# Patient Record
Sex: MALE | Race: WHITE | HISPANIC OR LATINO | Employment: UNEMPLOYED
[De-identification: names, ages, dates, MRNs, and addresses within clinical notes are randomized per-mention and may not be internally consistent; named-entity substitution may affect disease eponyms.]

---

## 2018-03-08 PROCEDURE — 80053 COMPREHEN METABOLIC PANEL: CPT | Performed by: INTERNAL MEDICINE

## 2018-03-08 PROCEDURE — 84443 ASSAY THYROID STIM HORMONE: CPT | Performed by: INTERNAL MEDICINE

## 2018-03-08 PROCEDURE — 85025 COMPLETE CBC W/AUTO DIFF WBC: CPT | Performed by: INTERNAL MEDICINE

## 2018-03-08 PROCEDURE — 80061 LIPID PANEL: CPT | Performed by: INTERNAL MEDICINE

## 2018-08-06 PROCEDURE — 80175 DRUG SCREEN QUAN LAMOTRIGINE: CPT | Performed by: INTERNAL MEDICINE

## 2019-04-15 ENCOUNTER — DIAGNOSTIC TRANS (OUTPATIENT)
Dept: OTHER | Age: 39
End: 2019-04-15

## 2019-04-15 ENCOUNTER — HOSPITAL (OUTPATIENT)
Dept: OTHER | Age: 39
End: 2019-04-15
Attending: EMERGENCY MEDICINE

## 2019-04-15 LAB
ALBUMIN SERPL-MCNC: 3.7 GM/DL (ref 3.6–5.1)
ALBUMIN/GLOB SERPL: 1.1 {RATIO} (ref 1–2.4)
ALP SERPL-CCNC: 78 UNIT/L (ref 45–117)
ALT SERPL-CCNC: 71 UNIT/L
ANALYZER ANC (IANC): NORMAL
ANION GAP SERPL CALC-SCNC: 10 MMOL/L (ref 10–20)
AST SERPL-CCNC: 31 UNIT/L
BASOPHILS # BLD: 0 THOUSAND/MCL (ref 0–0.3)
BASOPHILS NFR BLD: 1 %
BILIRUB SERPL-MCNC: 0.5 MG/DL (ref 0.2–1)
BUN SERPL-MCNC: 9 MG/DL (ref 6–20)
BUN/CREAT SERPL: 9 (ref 7–25)
CALCIUM SERPL-MCNC: 8.9 MG/DL (ref 8.4–10.2)
CHLORIDE: 109 MMOL/L (ref 98–107)
CO2 SERPL-SCNC: 28 MMOL/L (ref 21–32)
CREAT SERPL-MCNC: 0.98 MG/DL (ref 0.67–1.17)
DIFFERENTIAL METHOD BLD: NORMAL
EOSINOPHIL # BLD: 0.1 THOUSAND/MCL (ref 0.1–0.5)
EOSINOPHIL NFR BLD: 1 %
ERYTHROCYTE [DISTWIDTH] IN BLOOD: 11.7 % (ref 11–15)
GLOBULIN SER-MCNC: 3.3 GM/DL (ref 2–4)
GLUCOSE SERPL-MCNC: 128 MG/DL (ref 65–99)
HEMATOCRIT: 43 % (ref 39–51)
HGB BLD-MCNC: 14.7 GM/DL (ref 13–17)
IMM GRANULOCYTES # BLD AUTO: 0 THOUSAND/MCL (ref 0–0.2)
IMM GRANULOCYTES NFR BLD: 0 %
LYMPHOCYTES # BLD: 1.8 THOUSAND/MCL (ref 1–4.8)
LYMPHOCYTES NFR BLD: 32 %
MCH RBC QN AUTO: 30.9 PG (ref 26–34)
MCHC RBC AUTO-ENTMCNC: 34.2 GM/DL (ref 32–36.5)
MCV RBC AUTO: 90.5 FL (ref 78–100)
MONOCYTES # BLD: 0.3 THOUSAND/MCL (ref 0.3–0.9)
MONOCYTES NFR BLD: 5 %
NEUTROPHILS # BLD: 3.5 THOUSAND/MCL (ref 1.8–7.7)
NEUTROPHILS NFR BLD: 61 %
NEUTS SEG NFR BLD: NORMAL %
NRBC (NRBCRE): 0 /100 WBC
PLATELET # BLD: 234 THOUSAND/MCL (ref 140–450)
POTASSIUM SERPL-SCNC: 3.9 MMOL/L (ref 3.4–5.1)
PROT SERPL-MCNC: 7 GM/DL (ref 6.4–8.2)
RBC # BLD: 4.75 MILLION/MCL (ref 4.5–5.9)
SODIUM SERPL-SCNC: 143 MMOL/L (ref 135–145)
TROPONIN I SERPL HS-MCNC: <0.02 NG/ML
TROPONIN I SERPL HS-MCNC: <0.02 NG/ML
WBC # BLD: 5.6 THOUSAND/MCL (ref 4.2–11)

## 2020-02-28 ENCOUNTER — HOSPITAL (OUTPATIENT)
Dept: OTHER | Age: 40
End: 2020-02-28

## 2020-02-28 LAB
ALBUMIN SERPL-MCNC: 3.6 G/DL (ref 3.6–5.1)
ALBUMIN/GLOB SERPL: 1.1 {RATIO} (ref 1–2.4)
ALP SERPL-CCNC: 69 UNITS/L (ref 45–117)
ALT SERPL-CCNC: 51 UNITS/L
AMORPH SED URNS QL MICRO: NORMAL
ANALYZER ANC (IANC): ABNORMAL
ANION GAP SERPL CALC-SCNC: 10 MMOL/L (ref 10–20)
APPEARANCE UR: CLEAR
AST SERPL-CCNC: 25 UNITS/L
BACTERIA #/AREA URNS HPF: NORMAL /HPF
BASOPHILS # BLD: 0 K/MCL (ref 0–0.3)
BASOPHILS NFR BLD: 0 %
BILIRUB SERPL-MCNC: 0.3 MG/DL (ref 0.2–1)
BILIRUB UR QL: NEGATIVE
BUN SERPL-MCNC: 11 MG/DL (ref 6–20)
BUN/CREAT SERPL: 11 (ref 7–25)
CALCIUM SERPL-MCNC: 8.3 MG/DL (ref 8.4–10.2)
CAOX CRY URNS QL MICRO: NORMAL
CHLORIDE SERPL-SCNC: 106 MMOL/L (ref 98–107)
CO2 SERPL-SCNC: 27 MMOL/L (ref 21–32)
COLOR UR: YELLOW
CREAT SERPL-MCNC: 0.99 MG/DL (ref 0.67–1.17)
DIFFERENTIAL METHOD BLD: ABNORMAL
EOSINOPHIL # BLD: 0.1 K/MCL (ref 0.1–0.5)
EOSINOPHIL NFR BLD: 2 %
EPITH CASTS #/AREA URNS LPF: NORMAL /[LPF]
ERYTHROCYTE [DISTWIDTH] IN BLOOD: 12 % (ref 11–15)
FATTY CASTS #/AREA URNS LPF: NORMAL /[LPF]
GLOBULIN SER-MCNC: 3.4 G/DL (ref 2–4)
GLUCOSE SERPL-MCNC: 98 MG/DL (ref 65–99)
GLUCOSE UR-MCNC: NEGATIVE MG/DL
GRAN CASTS #/AREA URNS LPF: NORMAL /[LPF]
HCT VFR BLD CALC: 40.8 % (ref 39–51)
HGB BLD-MCNC: 14.2 G/DL (ref 13–17)
HGB UR QL: NEGATIVE
HYALINE CASTS #/AREA URNS LPF: NORMAL /LPF (ref 0–5)
IMM GRANULOCYTES # BLD AUTO: 0 K/MCL (ref 0–0.2)
IMM GRANULOCYTES NFR BLD: 0 %
KETONES UR-MCNC: NEGATIVE MG/DL
LEUKOCYTE ESTERASE UR QL STRIP: NEGATIVE
LIPASE SERPL-CCNC: 55 UNITS/L (ref 73–393)
LYMPHOCYTES # BLD: 2.4 K/MCL (ref 1–4.8)
LYMPHOCYTES NFR BLD: 34 %
MCH RBC QN AUTO: 31.9 PG (ref 26–34)
MCHC RBC AUTO-ENTMCNC: 34.8 G/DL (ref 32–36.5)
MCV RBC AUTO: 91.7 FL (ref 78–100)
MIXED CELL CASTS #/AREA URNS LPF: NORMAL /[LPF]
MONOCYTES # BLD: 0.7 K/MCL (ref 0.3–0.9)
MONOCYTES NFR BLD: 10 %
MUCOUS THREADS URNS QL MICRO: PRESENT
NEUTROPHILS # BLD: 3.7 K/MCL (ref 1.8–7.7)
NEUTROPHILS NFR BLD: 54 %
NEUTS SEG NFR BLD: ABNORMAL %
NITRITE UR QL: NEGATIVE
NRBC (NRBCRE): 0 /100 WBC
PH UR: 6 UNITS (ref 5–7)
PLATELET # BLD: 217 K/MCL (ref 140–450)
POTASSIUM SERPL-SCNC: 4.2 MMOL/L (ref 3.4–5.1)
PROT SERPL-MCNC: 7 G/DL (ref 6.4–8.2)
PROT UR QL: NEGATIVE MG/DL
RBC # BLD: 4.45 MIL/MCL (ref 4.5–5.9)
RBC #/AREA URNS HPF: NORMAL /HPF (ref 0–2)
RBC CASTS #/AREA URNS LPF: NORMAL /[LPF]
RENAL EPI CELLS #/AREA URNS HPF: NORMAL /[HPF]
SODIUM SERPL-SCNC: 139 MMOL/L (ref 135–145)
SP GR UR: 1.02 (ref 1–1.03)
SPECIMEN SOURCE: NORMAL
SPERM URNS QL MICRO: NORMAL
SQUAMOUS #/AREA URNS HPF: NORMAL /HPF (ref 0–5)
T VAGINALIS URNS QL MICRO: NORMAL
TRI-PHOS CRY URNS QL MICRO: NORMAL
URATE CRY URNS QL MICRO: NORMAL
UROBILINOGEN UR QL: 0.2 MG/DL (ref 0–1)
WAXY CASTS #/AREA URNS LPF: NORMAL /[LPF]
WBC # BLD: 6.9 K/MCL (ref 4.2–11)
WBC #/AREA URNS HPF: NORMAL /HPF (ref 0–5)
WBC CASTS #/AREA URNS LPF: NORMAL /[LPF]
YEAST HYPHAE URNS QL MICRO: NORMAL
YEAST URNS QL MICRO: NORMAL

## 2020-02-28 PROCEDURE — 99285 EMERGENCY DEPT VISIT HI MDM: CPT | Performed by: EMERGENCY MEDICINE

## 2020-02-29 ENCOUNTER — HOSPITAL (OUTPATIENT)
Dept: OTHER | Age: 40
End: 2020-02-29

## 2020-02-29 LAB
ALBUMIN SERPL-MCNC: 3.2 G/DL (ref 3.6–5.1)
ALBUMIN/GLOB SERPL: 1 {RATIO} (ref 1–2.4)
ALP SERPL-CCNC: 58 UNITS/L (ref 45–117)
ALT SERPL-CCNC: 41 UNITS/L
ANALYZER ANC (IANC): ABNORMAL
ANION GAP SERPL CALC-SCNC: 10 MMOL/L (ref 10–20)
AST SERPL-CCNC: 19 UNITS/L
BASOPHILS # BLD: 0 K/MCL (ref 0–0.3)
BASOPHILS NFR BLD: 1 %
BILIRUB SERPL-MCNC: 0.5 MG/DL (ref 0.2–1)
BUN SERPL-MCNC: 11 MG/DL (ref 6–20)
BUN/CREAT SERPL: 10 (ref 7–25)
CALCIUM SERPL-MCNC: 8.2 MG/DL (ref 8.4–10.2)
CHLORIDE SERPL-SCNC: 106 MMOL/L (ref 98–107)
CO2 SERPL-SCNC: 27 MMOL/L (ref 21–32)
CREAT SERPL-MCNC: 1.07 MG/DL (ref 0.67–1.17)
DIFFERENTIAL METHOD BLD: ABNORMAL
EOSINOPHIL # BLD: 0.1 K/MCL (ref 0.1–0.5)
EOSINOPHIL NFR BLD: 2 %
ERYTHROCYTE [DISTWIDTH] IN BLOOD: 11.9 % (ref 11–15)
GLOBULIN SER-MCNC: 3.1 G/DL (ref 2–4)
GLUCOSE BLDC GLUCOMTR-MCNC: 125 MG/DL (ref 70–99)
GLUCOSE SERPL-MCNC: 91 MG/DL (ref 65–99)
HCT VFR BLD CALC: 38.2 % (ref 39–51)
HGB BLD-MCNC: 12.9 G/DL (ref 13–17)
IMM GRANULOCYTES # BLD AUTO: 0 K/MCL (ref 0–0.2)
IMM GRANULOCYTES NFR BLD: 0 %
LYMPHOCYTES # BLD: 2.5 K/MCL (ref 1–4.8)
LYMPHOCYTES NFR BLD: 43 %
MCH RBC QN AUTO: 31.2 PG (ref 26–34)
MCHC RBC AUTO-ENTMCNC: 33.8 G/DL (ref 32–36.5)
MCV RBC AUTO: 92.5 FL (ref 78–100)
MONOCYTES # BLD: 0.6 K/MCL (ref 0.3–0.9)
MONOCYTES NFR BLD: 10 %
NEUTROPHILS # BLD: 2.6 K/MCL (ref 1.8–7.7)
NEUTROPHILS NFR BLD: 44 %
NEUTS SEG NFR BLD: ABNORMAL %
NRBC (NRBCRE): 0 /100 WBC
PLATELET # BLD: 188 K/MCL (ref 140–450)
POTASSIUM SERPL-SCNC: 3.7 MMOL/L (ref 3.4–5.1)
PROT SERPL-MCNC: 6.3 G/DL (ref 6.4–8.2)
RBC # BLD: 4.13 MIL/MCL (ref 4.5–5.9)
SODIUM SERPL-SCNC: 139 MMOL/L (ref 135–145)
WBC # BLD: 5.9 K/MCL (ref 4.2–11)

## 2020-02-29 PROCEDURE — 99244 OFF/OP CNSLTJ NEW/EST MOD 40: CPT | Performed by: SURGERY

## 2020-02-29 PROCEDURE — 99220 INITIAL OBSERVATION CARE,LEVL III: CPT | Performed by: GENERAL PRACTICE

## 2020-02-29 PROCEDURE — 44970 LAPAROSCOPY APPENDECTOMY: CPT | Performed by: SURGERY

## 2020-03-04 LAB — PATHOLOGIST NAME: NORMAL

## 2020-07-24 ENCOUNTER — LAB SERVICES (OUTPATIENT)
Dept: LAB | Age: 40
End: 2020-07-24

## 2020-07-24 ENCOUNTER — EMPLOYEE HEALTH (OUTPATIENT)
Dept: OTHER | Age: 40
End: 2020-07-24

## 2020-07-24 DIAGNOSIS — Z20.822 SUSPECTED COVID-19 VIRUS INFECTION: Primary | ICD-10-CM

## 2020-07-24 DIAGNOSIS — Z20.822 SUSPECTED COVID-19 VIRUS INFECTION: ICD-10-CM

## 2020-07-24 PROCEDURE — 87635 SARS-COV-2 COVID-19 AMP PRB: CPT | Performed by: HOSPITALIST

## 2020-07-25 ENCOUNTER — EMPLOYEE HEALTH (OUTPATIENT)
Dept: OTHER | Age: 40
End: 2020-07-25

## 2020-07-25 LAB
SARS-COV-2 RNA RESP QL NAA+PROBE: DETECTED
SPECIMEN SOURCE: ABNORMAL

## 2020-07-26 ENCOUNTER — EMPLOYEE HEALTH (OUTPATIENT)
Dept: OTHER | Age: 40
End: 2020-07-26

## 2020-07-27 ENCOUNTER — EMPLOYEE HEALTH (OUTPATIENT)
Dept: OTHER | Age: 40
End: 2020-07-27

## 2020-07-29 ENCOUNTER — EMPLOYEE HEALTH (OUTPATIENT)
Dept: OTHER | Age: 40
End: 2020-07-29

## 2020-07-31 ENCOUNTER — EMPLOYEE HEALTH (OUTPATIENT)
Dept: OTHER | Age: 40
End: 2020-07-31

## 2020-08-01 ENCOUNTER — EMPLOYEE HEALTH (OUTPATIENT)
Dept: OTHER | Age: 40
End: 2020-08-01

## 2020-08-04 ENCOUNTER — EMPLOYEE HEALTH (OUTPATIENT)
Dept: OTHER | Age: 40
End: 2020-08-04

## 2020-08-07 ENCOUNTER — EMPLOYEE HEALTH (OUTPATIENT)
Dept: OTHER | Age: 40
End: 2020-08-07

## 2020-08-08 ENCOUNTER — EMPLOYEE HEALTH (OUTPATIENT)
Dept: OTHER | Age: 40
End: 2020-08-08

## 2020-08-10 ENCOUNTER — EMPLOYEE HEALTH (OUTPATIENT)
Dept: OTHER | Age: 40
End: 2020-08-10

## 2020-08-18 ENCOUNTER — HOSPITAL ENCOUNTER (OUTPATIENT)
Dept: GENERAL RADIOLOGY | Age: 40
Discharge: HOME | End: 2020-08-18
Attending: INTERNAL MEDICINE
Payer: COMMERCIAL

## 2020-08-18 DIAGNOSIS — R06.02 SOB (SHORTNESS OF BREATH): ICD-10-CM

## 2020-08-18 DIAGNOSIS — U07.1 COVID-19 VIRUS INFECTION: ICD-10-CM

## 2020-08-18 PROCEDURE — 71046 X-RAY EXAM CHEST 2 VIEWS: CPT | Performed by: INTERNAL MEDICINE

## 2020-10-28 ENCOUNTER — TELEPHONE (OUTPATIENT)
Dept: SCHEDULING | Age: 40
End: 2020-10-28

## 2020-10-28 ENCOUNTER — WALK IN (OUTPATIENT)
Dept: URGENT CARE | Age: 40
End: 2020-10-28

## 2020-10-28 VITALS
SYSTOLIC BLOOD PRESSURE: 121 MMHG | WEIGHT: 226.74 LBS | DIASTOLIC BLOOD PRESSURE: 70 MMHG | HEIGHT: 70 IN | RESPIRATION RATE: 18 BRPM | TEMPERATURE: 97.6 F | BODY MASS INDEX: 32.46 KG/M2 | HEART RATE: 81 BPM | OXYGEN SATURATION: 96 %

## 2020-10-28 DIAGNOSIS — M62.831 MUSCLE SPASM OF LEFT CALF: Primary | ICD-10-CM

## 2020-10-28 PROCEDURE — 99203 OFFICE O/P NEW LOW 30 MIN: CPT | Performed by: NURSE PRACTITIONER

## 2020-10-28 RX ORDER — CYCLOBENZAPRINE HCL 10 MG
10 TABLET ORAL 3 TIMES DAILY PRN
Qty: 15 TABLET | Refills: 0 | Status: SHIPPED | OUTPATIENT
Start: 2020-10-28 | End: 2020-11-02

## 2020-10-28 RX ORDER — CYCLOBENZAPRINE HCL 10 MG
10 TABLET ORAL 3 TIMES DAILY PRN
Qty: 15 TABLET | Refills: 0 | Status: SHIPPED | OUTPATIENT
Start: 2020-10-28 | End: 2020-10-28 | Stop reason: SDUPTHER

## 2020-10-28 RX ORDER — ALBUTEROL SULFATE 90 UG/1
2 AEROSOL, METERED RESPIRATORY (INHALATION)
COMMUNITY
Start: 2020-10-19

## 2020-10-28 RX ORDER — TRAMADOL HYDROCHLORIDE 50 MG/1
50 TABLET ORAL
COMMUNITY
Start: 2015-05-01

## 2020-10-28 RX ORDER — METHOCARBAMOL 500 MG/1
500 TABLET, FILM COATED ORAL
COMMUNITY
Start: 2018-05-08

## 2020-10-28 RX ORDER — RISPERIDONE 0.5 MG/1
0.5 TABLET ORAL
COMMUNITY
Start: 2015-05-01

## 2020-10-28 RX ORDER — ZOLPIDEM TARTRATE 10 MG/1
10 TABLET ORAL
COMMUNITY
Start: 2019-03-25

## 2020-10-28 RX ORDER — LAMOTRIGINE 200 MG/1
200 TABLET ORAL
COMMUNITY
Start: 2015-05-01

## 2020-10-28 RX ORDER — RISPERIDONE 0.5 MG/1
TABLET ORAL
COMMUNITY
Start: 2020-10-20

## 2020-10-28 RX ORDER — TADALAFIL 20 MG/1
20 TABLET ORAL
COMMUNITY
Start: 2020-10-19

## 2020-10-28 RX ORDER — HYDROCODONE BITARTRATE AND IBUPROFEN 7.5; 2 MG/1; MG/1
1 TABLET, FILM COATED ORAL
COMMUNITY
Start: 2015-05-01

## 2020-10-28 RX ORDER — AZITHROMYCIN 250 MG/1
TABLET, FILM COATED ORAL
COMMUNITY
Start: 2020-10-19

## 2020-10-28 RX ORDER — PANTOPRAZOLE SODIUM 40 MG/1
TABLET, DELAYED RELEASE ORAL
COMMUNITY
Start: 2020-10-13

## 2020-10-28 ASSESSMENT — ENCOUNTER SYMPTOMS
RESPIRATORY NEGATIVE: 1
CONSTITUTIONAL NEGATIVE: 1
ENDOCRINE NEGATIVE: 1
ALLERGIC/IMMUNOLOGIC NEGATIVE: 1
HEMATOLOGIC/LYMPHATIC NEGATIVE: 1
NEUROLOGICAL NEGATIVE: 1
GASTROINTESTINAL NEGATIVE: 1
EYES NEGATIVE: 1
PSYCHIATRIC NEGATIVE: 1

## 2020-11-02 ENCOUNTER — HOSPITAL ENCOUNTER (OUTPATIENT)
Dept: ULTRASOUND IMAGING | Age: 40
Discharge: HOME | End: 2020-11-02
Attending: INTERNAL MEDICINE
Payer: COMMERCIAL

## 2020-11-02 DIAGNOSIS — M79.89 SWELLING OF LOWER EXTREMITY: ICD-10-CM

## 2020-11-02 PROCEDURE — 93971 EXTREMITY STUDY: CPT | Performed by: INTERNAL MEDICINE

## 2020-11-09 ENCOUNTER — HOSPITAL ENCOUNTER (OUTPATIENT)
Dept: ULTRASOUND IMAGING | Age: 40
Discharge: HOME | End: 2020-11-09
Attending: INTERNAL MEDICINE
Payer: COMMERCIAL

## 2020-11-09 DIAGNOSIS — I82.4Y2 ACUTE DEEP VEIN THROMBOSIS (DVT) OF PROXIMAL VEIN OF LEFT LOWER EXTREMITY (HCC): ICD-10-CM

## 2020-11-09 DIAGNOSIS — M79.604 ACUTE LEG PAIN, RIGHT: ICD-10-CM

## 2020-11-09 PROCEDURE — 93971 EXTREMITY STUDY: CPT | Performed by: INTERNAL MEDICINE

## 2020-12-01 ENCOUNTER — EMPLOYEE HEALTH (OUTPATIENT)
Dept: OTHER | Age: 40
End: 2020-12-01

## 2020-12-01 DIAGNOSIS — Z20.822 SUSPECTED COVID-19 VIRUS INFECTION: Primary | ICD-10-CM

## 2020-12-02 ENCOUNTER — LAB SERVICES (OUTPATIENT)
Dept: LAB | Age: 40
End: 2020-12-02

## 2020-12-02 DIAGNOSIS — Z20.822 SUSPECTED COVID-19 VIRUS INFECTION: ICD-10-CM

## 2020-12-02 LAB
SARS-COV-2 RNA RESP QL NAA+PROBE: NOT DETECTED
SERVICE CMNT-IMP: NORMAL
SPECIMEN SOURCE: NORMAL

## 2020-12-02 PROCEDURE — U0003 INFECTIOUS AGENT DETECTION BY NUCLEIC ACID (DNA OR RNA); SEVERE ACUTE RESPIRATORY SYNDROME CORONAVIRUS 2 (SARS-COV-2) (CORONAVIRUS DISEASE [COVID-19]), AMPLIFIED PROBE TECHNIQUE, MAKING USE OF HIGH THROUGHPUT TECHNOLOGIES AS DESCRIBED BY CMS-2020-01-R: HCPCS | Performed by: HOSPITALIST

## 2020-12-03 ENCOUNTER — EMPLOYEE HEALTH (OUTPATIENT)
Dept: OTHER | Age: 40
End: 2020-12-03

## 2020-12-04 ENCOUNTER — APPOINTMENT (OUTPATIENT)
Dept: LAB | Age: 40
End: 2020-12-04

## 2020-12-04 DIAGNOSIS — Z20.822 CLOSE EXPOSURE TO COVID-19 VIRUS: Primary | ICD-10-CM

## 2020-12-04 LAB — SARS-COV-2 AG RESP QL IA.RAPID: NEGATIVE

## 2020-12-04 PROCEDURE — 87426 SARSCOV CORONAVIRUS AG IA: CPT | Performed by: HOSPITALIST

## 2020-12-07 ENCOUNTER — APPOINTMENT (OUTPATIENT)
Dept: LAB | Age: 40
End: 2020-12-07

## 2020-12-07 DIAGNOSIS — Z20.822 CLOSE EXPOSURE TO COVID-19 VIRUS: Primary | ICD-10-CM

## 2020-12-07 LAB — SARS-COV-2 AG RESP QL IA.RAPID: NEGATIVE

## 2020-12-07 PROCEDURE — 87426 SARSCOV CORONAVIRUS AG IA: CPT | Performed by: HOSPITALIST

## 2020-12-10 ENCOUNTER — EMPLOYEE HEALTH (OUTPATIENT)
Dept: OTHER | Age: 40
End: 2020-12-10

## 2020-12-30 ENCOUNTER — IMMUNIZATION (OUTPATIENT)
Dept: LAB | Age: 40
End: 2020-12-30

## 2020-12-30 DIAGNOSIS — Z23 NEED FOR VACCINATION: Primary | ICD-10-CM

## 2020-12-30 PROCEDURE — 91301 COVID-19 MODERNA VACCINE: CPT | Performed by: EMERGENCY MEDICINE

## 2020-12-30 PROCEDURE — 0011A COVID-19 MODERNA VACCINE: CPT | Performed by: EMERGENCY MEDICINE

## 2021-01-29 ENCOUNTER — IMMUNIZATION (OUTPATIENT)
Dept: LAB | Age: 41
End: 2021-01-29
Attending: EMERGENCY MEDICINE

## 2021-01-29 DIAGNOSIS — Z23 NEED FOR VACCINATION: Primary | ICD-10-CM

## 2021-01-29 PROCEDURE — 91301 COVID-19 MODERNA VACCINE: CPT

## 2021-01-29 PROCEDURE — 0012A COVID-19 MODERNA VACCINE: CPT

## 2021-02-24 ENCOUNTER — HOSPITAL ENCOUNTER (EMERGENCY)
Age: 41
Discharge: HOME | End: 2021-02-24
Attending: EMERGENCY MEDICINE

## 2021-02-24 VITALS
DIASTOLIC BLOOD PRESSURE: 84 MMHG | BODY MASS INDEX: 32.54 KG/M2 | TEMPERATURE: 98.2 F | OXYGEN SATURATION: 98 % | HEART RATE: 81 BPM | SYSTOLIC BLOOD PRESSURE: 118 MMHG | HEIGHT: 70 IN | WEIGHT: 227.29 LBS | RESPIRATION RATE: 17 BRPM

## 2021-02-24 DIAGNOSIS — Z83.1: ICD-10-CM

## 2021-02-24 DIAGNOSIS — K52.9 ENTERITIS: Primary | ICD-10-CM

## 2021-02-24 DIAGNOSIS — E86.0 MILD DEHYDRATION: ICD-10-CM

## 2021-02-24 DIAGNOSIS — R19.7 DIARRHEA OF PRESUMED INFECTIOUS ORIGIN: ICD-10-CM

## 2021-02-24 DIAGNOSIS — R10.9 ABDOMINAL CRAMPING: ICD-10-CM

## 2021-02-24 LAB
ALBUMIN SERPL-MCNC: 3.6 G/DL (ref 3.6–5.1)
ALBUMIN/GLOB SERPL: 0.9 {RATIO} (ref 1–2.4)
ALP SERPL-CCNC: 76 UNITS/L (ref 45–117)
ALT SERPL-CCNC: 38 UNITS/L
ANION GAP SERPL CALC-SCNC: 10 MMOL/L (ref 10–20)
AST SERPL-CCNC: 24 UNITS/L
BASOPHILS # BLD: 0 K/MCL (ref 0–0.3)
BASOPHILS NFR BLD: 1 %
BILIRUB SERPL-MCNC: 0.8 MG/DL (ref 0.2–1)
BUN SERPL-MCNC: 9 MG/DL (ref 6–20)
BUN/CREAT SERPL: 8 (ref 7–25)
CALCIUM SERPL-MCNC: 9.3 MG/DL (ref 8.4–10.2)
CHLORIDE SERPL-SCNC: 101 MMOL/L (ref 98–107)
CO2 SERPL-SCNC: 27 MMOL/L (ref 21–32)
CREAT SERPL-MCNC: 1.12 MG/DL (ref 0.67–1.17)
DEPRECATED RDW RBC: 39.9 FL (ref 39–50)
EOSINOPHIL # BLD: 0 K/MCL (ref 0–0.5)
EOSINOPHIL NFR BLD: 0 %
ERYTHROCYTE [DISTWIDTH] IN BLOOD: 12.1 % (ref 11–15)
FASTING DURATION TIME PATIENT: ABNORMAL H
GFR SERPLBLD BASED ON 1.73 SQ M-ARVRAT: 82 ML/MIN/1.73M2
GLOBULIN SER-MCNC: 4.2 G/DL (ref 2–4)
GLUCOSE SERPL-MCNC: 113 MG/DL (ref 65–99)
HCT VFR BLD CALC: 48.7 % (ref 39–51)
HGB BLD-MCNC: 16.3 G/DL (ref 13–17)
LG PLATELETS BLD QL SMEAR: PRESENT
LIPASE SERPL-CCNC: 111 UNITS/L (ref 73–393)
LYMPHOCYTES # BLD: 1 K/MCL (ref 1–4.8)
LYMPHOCYTES NFR BLD: 21 %
MCH RBC QN AUTO: 30.2 PG (ref 26–34)
MCHC RBC AUTO-ENTMCNC: 33.5 G/DL (ref 32–36.5)
MCV RBC AUTO: 90.2 FL (ref 78–100)
MONOCYTES # BLD: 0.5 K/MCL (ref 0.3–0.9)
MONOCYTES NFR BLD: 11 %
NEUTROPHILS # BLD: 2.6 K/MCL (ref 1.8–7.7)
NEUTS BAND NFR BLD: 13 % (ref 0–10)
NEUTS SEG NFR BLD: 51 %
NRBC BLD MANUAL-RTO: 0 /100 WBC
PLATELET # BLD AUTO: 164 K/MCL (ref 140–450)
POLYCHROMASIA BLD QL SMEAR: ABNORMAL
POTASSIUM SERPL-SCNC: 3.9 MMOL/L (ref 3.4–5.1)
PROT SERPL-MCNC: 7.8 G/DL (ref 6.4–8.2)
RAINBOW EXTRA TUBES HOLD SPECIMEN: NORMAL
RBC # BLD: 5.4 MIL/MCL (ref 4.5–5.9)
SODIUM SERPL-SCNC: 134 MMOL/L (ref 135–145)
VARIANT LYMPHS NFR BLD: 3 % (ref 0–5)
WBC # BLD: 4.1 K/MCL (ref 4.2–11)

## 2021-02-24 PROCEDURE — 10002800 HB RX 250 W HCPCS: Performed by: EMERGENCY MEDICINE

## 2021-02-24 PROCEDURE — 85027 COMPLETE CBC AUTOMATED: CPT | Performed by: EMERGENCY MEDICINE

## 2021-02-24 PROCEDURE — 96361 HYDRATE IV INFUSION ADD-ON: CPT

## 2021-02-24 PROCEDURE — 10002807 HB RX 258: Performed by: EMERGENCY MEDICINE

## 2021-02-24 PROCEDURE — 83690 ASSAY OF LIPASE: CPT | Performed by: EMERGENCY MEDICINE

## 2021-02-24 PROCEDURE — 80053 COMPREHEN METABOLIC PANEL: CPT | Performed by: EMERGENCY MEDICINE

## 2021-02-24 PROCEDURE — 99284 EMERGENCY DEPT VISIT MOD MDM: CPT | Performed by: EMERGENCY MEDICINE

## 2021-02-24 PROCEDURE — 99284 EMERGENCY DEPT VISIT MOD MDM: CPT

## 2021-02-24 PROCEDURE — 96374 THER/PROPH/DIAG INJ IV PUSH: CPT

## 2021-02-24 RX ORDER — ONDANSETRON 2 MG/ML
4 INJECTION INTRAMUSCULAR; INTRAVENOUS ONCE
Status: COMPLETED | OUTPATIENT
Start: 2021-02-24 | End: 2021-02-24

## 2021-02-24 RX ADMIN — SODIUM CHLORIDE, SODIUM LACTATE, POTASSIUM CHLORIDE, AND CALCIUM CHLORIDE 1000 ML: .6; .31; .03; .02 INJECTION, SOLUTION INTRAVENOUS at 10:20

## 2021-02-24 RX ADMIN — SODIUM CHLORIDE, POTASSIUM CHLORIDE, SODIUM LACTATE AND CALCIUM CHLORIDE 1000 ML: 600; 310; 30; 20 INJECTION, SOLUTION INTRAVENOUS at 12:50

## 2021-02-24 RX ADMIN — ONDANSETRON 4 MG: 2 INJECTION INTRAMUSCULAR; INTRAVENOUS at 10:26

## 2021-02-24 ASSESSMENT — ENCOUNTER SYMPTOMS
BRUISES/BLEEDS EASILY: 0
BLOOD IN STOOL: 1
NERVOUS/ANXIOUS: 0
APPETITE CHANGE: 1
SORE THROAT: 0
COUGH: 0
POLYDIPSIA: 0
ADENOPATHY: 0
VOMITING: 0
POLYPHAGIA: 0
RHINORRHEA: 0
DIARRHEA: 1
WEAKNESS: 0
FEVER: 1
ABDOMINAL PAIN: 1
EYE DISCHARGE: 0
NAUSEA: 1
BACK PAIN: 0
FATIGUE: 1
WOUND: 0
SHORTNESS OF BREATH: 0
HEADACHES: 0

## 2021-02-24 ASSESSMENT — PAIN SCALES - GENERAL: PAINLEVEL_OUTOF10: 5

## 2021-02-25 ENCOUNTER — CASE MANAGEMENT (OUTPATIENT)
Dept: CARE COORDINATION | Age: 41
End: 2021-02-25

## 2021-03-01 ENCOUNTER — CASE MANAGEMENT (OUTPATIENT)
Dept: CARE COORDINATION | Age: 41
End: 2021-03-01

## 2021-12-27 ENCOUNTER — APPOINTMENT (OUTPATIENT)
Dept: HEMATOLOGY/ONCOLOGY | Facility: HOSPITAL | Age: 41
End: 2021-12-27
Attending: STUDENT IN AN ORGANIZED HEALTH CARE EDUCATION/TRAINING PROGRAM
Payer: COMMERCIAL

## 2021-12-27 ENCOUNTER — TELEPHONE (OUTPATIENT)
Dept: HEMATOLOGY/ONCOLOGY | Facility: HOSPITAL | Age: 41
End: 2021-12-27

## 2021-12-27 ENCOUNTER — CASE MANAGEMENT (OUTPATIENT)
Dept: CARE COORDINATION | Age: 41
End: 2021-12-27

## 2021-12-27 NOTE — TELEPHONE ENCOUNTER
Writer called patient to cancel appointment scheduled for today 12/27 and rescheduled appointment for 1/6.

## 2021-12-27 NOTE — TELEPHONE ENCOUNTER
Writer called patient and appointment scheduled for 1/6 was moved from 2:30 to 4:30pm due to scheduling error.

## 2021-12-28 ENCOUNTER — CASE MANAGEMENT (OUTPATIENT)
Dept: CARE COORDINATION | Age: 41
End: 2021-12-28

## 2021-12-29 ENCOUNTER — CASE MANAGEMENT (OUTPATIENT)
Dept: CARE COORDINATION | Age: 41
End: 2021-12-29

## 2021-12-29 ASSESSMENT — SLEEP AND FATIGUE QUESTIONNAIRES
HOURS OF UNINTERRUPTED SLEEP: 8
SLEEP DESCRIPTORS: REPORTS NO PROBLEM

## 2021-12-29 ASSESSMENT — ACTIVITIES OF DAILY LIVING (ADL): DME_IN_USE: NO

## 2022-01-05 ENCOUNTER — CASE MANAGEMENT (OUTPATIENT)
Dept: CARE COORDINATION | Age: 42
End: 2022-01-05

## 2022-01-06 ENCOUNTER — OFFICE VISIT (OUTPATIENT)
Dept: HEMATOLOGY/ONCOLOGY | Facility: HOSPITAL | Age: 42
End: 2022-01-06
Attending: STUDENT IN AN ORGANIZED HEALTH CARE EDUCATION/TRAINING PROGRAM
Payer: COMMERCIAL

## 2022-01-06 VITALS
HEIGHT: 70 IN | HEART RATE: 109 BPM | DIASTOLIC BLOOD PRESSURE: 58 MMHG | RESPIRATION RATE: 16 BRPM | OXYGEN SATURATION: 97 % | WEIGHT: 251.81 LBS | SYSTOLIC BLOOD PRESSURE: 117 MMHG | BODY MASS INDEX: 36.05 KG/M2

## 2022-01-06 DIAGNOSIS — I82.411 ACUTE DEEP VEIN THROMBOSIS (DVT) OF FEMORAL VEIN OF RIGHT LOWER EXTREMITY (HCC): Primary | ICD-10-CM

## 2022-01-06 PROCEDURE — 99244 OFF/OP CNSLTJ NEW/EST MOD 40: CPT | Performed by: STUDENT IN AN ORGANIZED HEALTH CARE EDUCATION/TRAINING PROGRAM

## 2022-01-07 NOTE — CONSULTS
[address] [facility] Consult Note    Patient Name: Fredis Douglas   YOB: 1980   Medical Record Number: [id number]  Referring Physician(s): Sydney Hudson    Reason for consultation: recurrent RLE DVT    S He did have a scan of his lungs which was negative for pulmonary embolism. He denies any significant shortness of breath or pleuritic chest pain. He is tolerating anticoagulation well without any significant bleeding episodes.     Socially, patient is a 60 tablet, Rfl: 0  risperiDONE 0.5 MG Oral Tab, Take 1 tablet (0.5 mg total) by mouth nightly., Disp: 90 tablet, Rfl: 1  lamoTRIgine 200 MG Oral Tab, Take 1 tablet (200 mg total) by mouth daily. , Disp: 90 tablet, Rfl: 0  ubrogepant 100 MG Oral Tab, Take 1 file  Intimate Partner Violence: Not on file  Housing Stability: Not on file    Objective:   Blood pressure 117/58, pulse 109, resp. rate 16, height 1.778 m (5' 10\"), weight 114.2 kg (251 lb 12.8 oz), SpO2 97 %.   Physical Exam:  General: A&Ox3, NAD  HEENT and unprovoked DVTs and the clinical implications of recurrent DVT. We discussed thrombophilia testing and the fact that well it will not necessarily change our management could provide some useful information to both the patient and his children.   He is

## 2022-01-11 ENCOUNTER — HOSPITAL ENCOUNTER (OUTPATIENT)
Dept: CV DIAGNOSTICS | Facility: HOSPITAL | Age: 42
Discharge: HOME | End: 2022-01-11
Attending: INTERNAL MEDICINE
Payer: COMMERCIAL

## 2022-01-11 DIAGNOSIS — I31.3 PERICARDIAL EFFUSION: ICD-10-CM

## 2022-01-11 PROCEDURE — 93306 TTE W/DOPPLER COMPLETE: CPT | Performed by: INTERNAL MEDICINE

## 2022-01-12 ENCOUNTER — CASE MANAGEMENT (OUTPATIENT)
Dept: CARE COORDINATION | Age: 42
End: 2022-01-12

## 2022-01-25 PROBLEM — U07.1 DISEASE DUE TO SEVERE ACUTE RESPIRATORY SYNDROME CORONAVIRUS 2 (SARS-COV-2): Status: ACTIVE | Noted: 2022-01-25

## 2022-01-25 PROBLEM — I82.90 THROMBUS: Status: ACTIVE | Noted: 2022-01-25

## 2022-01-25 PROBLEM — Z90.49 HX OF APPENDECTOMY: Status: ACTIVE | Noted: 2022-01-25

## 2022-02-08 ENCOUNTER — LAB ENCOUNTER (OUTPATIENT)
Dept: LAB | Age: 42
End: 2022-02-08
Attending: STUDENT IN AN ORGANIZED HEALTH CARE EDUCATION/TRAINING PROGRAM
Payer: COMMERCIAL

## 2022-02-08 DIAGNOSIS — I82.411 ACUTE DEEP VEIN THROMBOSIS (DVT) OF FEMORAL VEIN OF RIGHT LOWER EXTREMITY (HCC): ICD-10-CM

## 2022-02-08 PROCEDURE — 81240 F2 GENE: CPT

## 2022-02-08 PROCEDURE — 85390 FIBRINOLYSINS SCREEN I&R: CPT

## 2022-02-08 PROCEDURE — 85598 HEXAGNAL PHOSPH PLTLT NEUTRL: CPT

## 2022-02-08 PROCEDURE — 81241 F5 GENE: CPT

## 2022-02-08 PROCEDURE — 85730 THROMBOPLASTIN TIME PARTIAL: CPT

## 2022-02-08 PROCEDURE — 85613 RUSSELL VIPER VENOM DILUTED: CPT

## 2022-02-08 PROCEDURE — 85610 PROTHROMBIN TIME: CPT

## 2022-02-08 PROCEDURE — 86147 CARDIOLIPIN ANTIBODY EA IG: CPT

## 2022-02-08 PROCEDURE — 36415 COLL VENOUS BLD VENIPUNCTURE: CPT

## 2022-02-08 PROCEDURE — 86146 BETA-2 GLYCOPROTEIN ANTIBODY: CPT

## 2022-02-09 LAB
F2 C.20210G>A GENO BLD/T: NORMAL
F5 P.R506Q BLD/T QL: NORMAL

## 2022-02-11 LAB
B2 GLYCOPROT1 IGG SERPL IA-ACNC: 1.1 U/ML (ref ?–7)
B2 GLYCOPROT1 IGM SERPL IA-ACNC: <2.9 U/ML (ref ?–7)
CARDIOLIPIN IGG SERPL-ACNC: 2.6 GPL (ref ?–10)
CARDIOLIPIN IGM SERPL-ACNC: 1 MPL (ref ?–10)
CONFIRM APTT STACLOT: NEGATIVE
CONFIRM DRVVT: 1.3 S (ref 0–1.1)
PROTHROMBIN TIME: 13.2 SECONDS (ref 11.6–14.8)
SCREEN DRVVT: 1.1 S (ref 0–1.1)

## 2022-02-23 ENCOUNTER — HOSPITAL ENCOUNTER (OUTPATIENT)
Dept: ULTRASOUND IMAGING | Facility: HOSPITAL | Age: 42
Discharge: HOME | End: 2022-02-23
Attending: INTERNAL MEDICINE
Payer: COMMERCIAL

## 2022-02-23 DIAGNOSIS — I82.4Y1 ACUTE DEEP VEIN THROMBOSIS (DVT) OF PROXIMAL VEIN OF RIGHT LOWER EXTREMITY (HCC): ICD-10-CM

## 2022-02-23 PROCEDURE — 93971 EXTREMITY STUDY: CPT | Performed by: INTERNAL MEDICINE

## 2022-04-04 ENCOUNTER — OFFICE VISIT (OUTPATIENT)
Dept: HEMATOLOGY/ONCOLOGY | Facility: HOSPITAL | Age: 42
End: 2022-04-04
Attending: STUDENT IN AN ORGANIZED HEALTH CARE EDUCATION/TRAINING PROGRAM
Payer: COMMERCIAL

## 2022-04-04 VITALS
SYSTOLIC BLOOD PRESSURE: 130 MMHG | BODY MASS INDEX: 36.51 KG/M2 | WEIGHT: 255 LBS | HEART RATE: 83 BPM | RESPIRATION RATE: 16 BRPM | HEIGHT: 70 IN | OXYGEN SATURATION: 98 % | DIASTOLIC BLOOD PRESSURE: 72 MMHG | TEMPERATURE: 98 F

## 2022-04-04 DIAGNOSIS — I82.411 ACUTE DEEP VEIN THROMBOSIS (DVT) OF FEMORAL VEIN OF RIGHT LOWER EXTREMITY (HCC): Primary | ICD-10-CM

## 2022-04-04 DIAGNOSIS — I87.2 VENOUS INSUFFICIENCY OF RIGHT LOWER EXTREMITY: ICD-10-CM

## 2022-04-04 PROCEDURE — 99214 OFFICE O/P EST MOD 30 MIN: CPT | Performed by: STUDENT IN AN ORGANIZED HEALTH CARE EDUCATION/TRAINING PROGRAM

## 2022-07-01 ENCOUNTER — TELEPHONE (OUTPATIENT)
Dept: HEMATOLOGY/ONCOLOGY | Facility: HOSPITAL | Age: 42
End: 2022-07-01

## 2022-08-08 ENCOUNTER — APPOINTMENT (OUTPATIENT)
Dept: HEMATOLOGY/ONCOLOGY | Facility: HOSPITAL | Age: 42
End: 2022-08-08
Attending: STUDENT IN AN ORGANIZED HEALTH CARE EDUCATION/TRAINING PROGRAM
Payer: COMMERCIAL

## 2022-08-16 ENCOUNTER — APPOINTMENT (OUTPATIENT)
Dept: HEMATOLOGY/ONCOLOGY | Facility: HOSPITAL | Age: 42
End: 2022-08-16
Attending: STUDENT IN AN ORGANIZED HEALTH CARE EDUCATION/TRAINING PROGRAM
Payer: COMMERCIAL

## 2022-08-17 ENCOUNTER — OFFICE VISIT (OUTPATIENT)
Dept: HEMATOLOGY/ONCOLOGY | Facility: HOSPITAL | Age: 42
End: 2022-08-17
Attending: STUDENT IN AN ORGANIZED HEALTH CARE EDUCATION/TRAINING PROGRAM
Payer: COMMERCIAL

## 2022-08-17 VITALS
DIASTOLIC BLOOD PRESSURE: 78 MMHG | SYSTOLIC BLOOD PRESSURE: 130 MMHG | WEIGHT: 249.81 LBS | RESPIRATION RATE: 18 BRPM | OXYGEN SATURATION: 96 % | HEART RATE: 87 BPM | HEIGHT: 70 IN | BODY MASS INDEX: 35.76 KG/M2 | TEMPERATURE: 99 F

## 2022-08-17 DIAGNOSIS — Z79.01 ANTICOAGULATION MANAGEMENT ENCOUNTER: ICD-10-CM

## 2022-08-17 DIAGNOSIS — I82.411 ACUTE DEEP VEIN THROMBOSIS (DVT) OF FEMORAL VEIN OF RIGHT LOWER EXTREMITY (HCC): Primary | ICD-10-CM

## 2022-08-17 DIAGNOSIS — Z51.81 ANTICOAGULATION MANAGEMENT ENCOUNTER: ICD-10-CM

## 2022-08-17 DIAGNOSIS — I87.2 VENOUS INSUFFICIENCY OF RIGHT LOWER EXTREMITY: ICD-10-CM

## 2022-08-17 PROCEDURE — 99214 OFFICE O/P EST MOD 30 MIN: CPT | Performed by: STUDENT IN AN ORGANIZED HEALTH CARE EDUCATION/TRAINING PROGRAM

## 2022-10-03 ENCOUNTER — HOSPITAL ENCOUNTER (EMERGENCY)
Facility: HOSPITAL | Age: 42
Discharge: HOME | End: 2022-10-03
Payer: COMMERCIAL

## 2022-10-03 ENCOUNTER — APPOINTMENT (OUTPATIENT)
Dept: MRI IMAGING | Facility: HOSPITAL | Age: 42
End: 2022-10-03
Attending: NURSE PRACTITIONER
Payer: COMMERCIAL

## 2022-10-03 VITALS
SYSTOLIC BLOOD PRESSURE: 124 MMHG | BODY MASS INDEX: 36 KG/M2 | OXYGEN SATURATION: 97 % | TEMPERATURE: 98 F | DIASTOLIC BLOOD PRESSURE: 82 MMHG | HEART RATE: 74 BPM | WEIGHT: 250 LBS | RESPIRATION RATE: 18 BRPM

## 2022-10-03 DIAGNOSIS — R51.9 INTRACTABLE HEADACHE, UNSPECIFIED CHRONICITY PATTERN, UNSPECIFIED HEADACHE TYPE: Primary | ICD-10-CM

## 2022-10-03 LAB
ANION GAP SERPL CALC-SCNC: 6 MMOL/L (ref 0–18)
BASOPHILS # BLD AUTO: 0.03 X10(3) UL (ref 0–0.2)
BASOPHILS NFR BLD AUTO: 0.5 %
BUN BLD-MCNC: 12 MG/DL (ref 7–18)
BUN/CREAT SERPL: 11.8 (ref 10–20)
CALCIUM BLD-MCNC: 9 MG/DL (ref 8.5–10.1)
CHLORIDE SERPL-SCNC: 108 MMOL/L (ref 98–112)
CO2 SERPL-SCNC: 26 MMOL/L (ref 21–32)
CREAT BLD-MCNC: 1.02 MG/DL
DEPRECATED RDW RBC AUTO: 40.7 FL (ref 35.1–46.3)
EOSINOPHIL # BLD AUTO: 0.06 X10(3) UL (ref 0–0.7)
EOSINOPHIL NFR BLD AUTO: 1 %
ERYTHROCYTE [DISTWIDTH] IN BLOOD BY AUTOMATED COUNT: 12 % (ref 11–15)
GFR SERPLBLD BASED ON 1.73 SQ M-ARVRAT: 94 ML/MIN/1.73M2 (ref 60–?)
GLUCOSE BLD-MCNC: 128 MG/DL (ref 70–99)
HCT VFR BLD AUTO: 46.1 %
HGB BLD-MCNC: 15.7 G/DL
IMM GRANULOCYTES # BLD AUTO: 0.01 X10(3) UL (ref 0–1)
IMM GRANULOCYTES NFR BLD: 0.2 %
LYMPHOCYTES # BLD AUTO: 1.9 X10(3) UL (ref 1–4)
LYMPHOCYTES NFR BLD AUTO: 31.5 %
MCH RBC QN AUTO: 31.3 PG (ref 26–34)
MCHC RBC AUTO-ENTMCNC: 34.1 G/DL (ref 31–37)
MCV RBC AUTO: 91.8 FL
MONOCYTES # BLD AUTO: 0.45 X10(3) UL (ref 0.1–1)
MONOCYTES NFR BLD AUTO: 7.5 %
NEUTROPHILS # BLD AUTO: 3.58 X10 (3) UL (ref 1.5–7.7)
NEUTROPHILS # BLD AUTO: 3.58 X10(3) UL (ref 1.5–7.7)
NEUTROPHILS NFR BLD AUTO: 59.3 %
OSMOLALITY SERPL CALC.SUM OF ELEC: 291 MOSM/KG (ref 275–295)
PLATELET # BLD AUTO: 228 10(3)UL (ref 150–450)
POTASSIUM SERPL-SCNC: 4.9 MMOL/L (ref 3.5–5.1)
RBC # BLD AUTO: 5.02 X10(6)UL
SODIUM SERPL-SCNC: 140 MMOL/L (ref 136–145)
TROPONIN I HIGH SENSITIVITY: 27 NG/L
WBC # BLD AUTO: 6 X10(3) UL (ref 4–11)

## 2022-10-03 PROCEDURE — 96375 TX/PRO/DX INJ NEW DRUG ADDON: CPT

## 2022-10-03 PROCEDURE — 99284 EMERGENCY DEPT VISIT MOD MDM: CPT

## 2022-10-03 PROCEDURE — 84484 ASSAY OF TROPONIN QUANT: CPT | Performed by: NURSE PRACTITIONER

## 2022-10-03 PROCEDURE — 80048 BASIC METABOLIC PNL TOTAL CA: CPT | Performed by: NURSE PRACTITIONER

## 2022-10-03 PROCEDURE — 96374 THER/PROPH/DIAG INJ IV PUSH: CPT

## 2022-10-03 PROCEDURE — 93010 ELECTROCARDIOGRAM REPORT: CPT | Performed by: NURSE PRACTITIONER

## 2022-10-03 PROCEDURE — 93005 ELECTROCARDIOGRAM TRACING: CPT

## 2022-10-03 PROCEDURE — 70551 MRI BRAIN STEM W/O DYE: CPT | Performed by: NURSE PRACTITIONER

## 2022-10-03 PROCEDURE — 85025 COMPLETE CBC W/AUTO DIFF WBC: CPT | Performed by: NURSE PRACTITIONER

## 2022-10-03 RX ORDER — DIPHENHYDRAMINE HYDROCHLORIDE 50 MG/ML
12.5 INJECTION INTRAMUSCULAR; INTRAVENOUS ONCE
Status: COMPLETED | OUTPATIENT
Start: 2022-10-03 | End: 2022-10-03

## 2022-10-03 RX ORDER — METOCLOPRAMIDE HYDROCHLORIDE 5 MG/ML
10 INJECTION INTRAMUSCULAR; INTRAVENOUS ONCE
Status: COMPLETED | OUTPATIENT
Start: 2022-10-03 | End: 2022-10-03

## 2022-10-03 RX ORDER — AMOXICILLIN AND CLAVULANATE POTASSIUM 875; 125 MG/1; MG/1
1 TABLET, FILM COATED ORAL 2 TIMES DAILY
Qty: 20 TABLET | Refills: 0 | Status: SHIPPED | OUTPATIENT
Start: 2022-10-03 | End: 2022-10-13

## 2022-10-03 NOTE — ED INITIAL ASSESSMENT (HPI)
PT SENT TO ER FROM PCP FOR AN MRI OF THE BRAIN BECAUSE HE HAS HAD MIGRAINES SINCE THE 25TH OF September AND STATES HE SEES HALOS AND STARS FROM HEADLIGHTS. PT STATES HE HAS A HX OF MIGRAINES. PT STATES THE PAIN HAS BEEN CONSTANT SINCE THE 25TH.

## 2022-10-04 ENCOUNTER — HOSPITAL ENCOUNTER (OUTPATIENT)
Dept: ULTRASOUND IMAGING | Facility: HOSPITAL | Age: 42
Discharge: HOME | End: 2022-10-04
Attending: INTERNAL MEDICINE
Payer: COMMERCIAL

## 2022-10-04 DIAGNOSIS — G43.819 OTHER MIGRAINE WITHOUT STATUS MIGRAINOSUS, INTRACTABLE: ICD-10-CM

## 2022-10-04 DIAGNOSIS — H53.9 VISION CHANGES: ICD-10-CM

## 2022-10-04 PROCEDURE — 93880 EXTRACRANIAL BILAT STUDY: CPT | Performed by: INTERNAL MEDICINE

## 2022-10-05 ENCOUNTER — OFFICE VISIT (OUTPATIENT)
Dept: NEUROLOGY | Facility: CLINIC | Age: 42
End: 2022-10-05
Payer: COMMERCIAL

## 2022-10-05 VITALS
HEIGHT: 70 IN | SYSTOLIC BLOOD PRESSURE: 116 MMHG | BODY MASS INDEX: 36.08 KG/M2 | DIASTOLIC BLOOD PRESSURE: 72 MMHG | WEIGHT: 252 LBS

## 2022-10-05 DIAGNOSIS — R42 VERTIGO: ICD-10-CM

## 2022-10-05 DIAGNOSIS — G43.109 MIGRAINE WITH AURA AND WITHOUT STATUS MIGRAINOSUS, NOT INTRACTABLE: Primary | ICD-10-CM

## 2022-10-05 PROCEDURE — 3074F SYST BP LT 130 MM HG: CPT | Performed by: OTHER

## 2022-10-05 PROCEDURE — 3008F BODY MASS INDEX DOCD: CPT | Performed by: OTHER

## 2022-10-05 PROCEDURE — 99204 OFFICE O/P NEW MOD 45 MIN: CPT | Performed by: OTHER

## 2022-10-05 PROCEDURE — 3078F DIAST BP <80 MM HG: CPT | Performed by: OTHER

## 2022-10-05 RX ORDER — CALCIUM CARBONATE 300MG(750)
400 TABLET,CHEWABLE ORAL 2 TIMES DAILY
Qty: 10 TABLET | Refills: 0 | Status: SHIPPED | OUTPATIENT
Start: 2022-10-05

## 2022-10-05 RX ORDER — SUMATRIPTAN 100 MG/1
TABLET, FILM COATED ORAL
Qty: 9 TABLET | Refills: 3 | Status: SHIPPED | OUTPATIENT
Start: 2022-10-05

## 2022-10-05 RX ORDER — ONDANSETRON HYDROCHLORIDE 8 MG/1
TABLET, FILM COATED ORAL
Qty: 10 TABLET | Refills: 0 | Status: SHIPPED | OUTPATIENT
Start: 2022-10-05

## 2022-10-05 RX ORDER — METHYLPREDNISOLONE 4 MG/1
TABLET ORAL
Qty: 1 EACH | Refills: 0 | Status: SHIPPED | OUTPATIENT
Start: 2022-10-05

## 2022-10-05 RX ORDER — DIPHENHYDRAMINE HCL 25 MG
TABLET ORAL
Qty: 5 TABLET | Refills: 0 | Status: SHIPPED | OUTPATIENT
Start: 2022-10-05

## 2022-10-05 RX ORDER — DIVALPROEX SODIUM 500 MG/1
500 TABLET, EXTENDED RELEASE ORAL DAILY
Qty: 5 TABLET | Refills: 0 | Status: SHIPPED | OUTPATIENT
Start: 2022-10-05 | End: 2022-10-10

## 2022-10-07 ENCOUNTER — TELEPHONE (OUTPATIENT)
Dept: NEUROLOGY | Facility: CLINIC | Age: 42
End: 2022-10-07

## 2022-10-07 NOTE — TELEPHONE ENCOUNTER
Received a PA request from [address] for Quilipta 60mg. PA has been completed and faxed. Will await determination. Reviewed and electronically signed by:  [facility], [address] [place] [address], [facility]

## 2022-10-13 ENCOUNTER — TELEPHONE (OUTPATIENT)
Dept: NEUROLOGY | Facility: CLINIC | Age: 42
End: 2022-10-13

## 2022-10-13 NOTE — TELEPHONE ENCOUNTER
Received FMLA forms from [facility] [employer]. Patient seen on 10/5/22 and given note to be off work for one month. Follow-up on 11/2. Forms filled out accordingly and placed on Dr. Maira Quinn desethel for review and signature.

## 2022-10-14 NOTE — TELEPHONE ENCOUNTER
Forms were completed. Copy sent to scanning and copy at  for . Left detailed message for patient notifying him that it is ready for .

## 2022-10-26 ENCOUNTER — TELEPHONE (OUTPATIENT)
Dept: PHYSICAL THERAPY | Facility: HOSPITAL | Age: 42
End: 2022-10-26

## 2022-10-27 ENCOUNTER — OFFICE VISIT (OUTPATIENT)
Dept: PHYSICAL THERAPY | Age: 42
End: 2022-10-27
Attending: Other
Payer: COMMERCIAL

## 2022-10-27 DIAGNOSIS — R42 VERTIGO: ICD-10-CM

## 2022-10-27 DIAGNOSIS — G43.109 MIGRAINE WITH AURA AND WITHOUT STATUS MIGRAINOSUS, NOT INTRACTABLE: ICD-10-CM

## 2022-10-27 PROCEDURE — 97530 THERAPEUTIC ACTIVITIES: CPT | Performed by: PHYSICAL THERAPIST

## 2022-10-27 PROCEDURE — 97162 PT EVAL MOD COMPLEX 30 MIN: CPT | Performed by: PHYSICAL THERAPIST

## 2022-10-27 NOTE — PROGRESS NOTES
PHYSICAL THERAPY EVALUATION:   Referring Physician: Dr. Dennie Boxer  Diagnosis: Migraine with aura and without status migrainosus, not intractable (G43.109)  Vertigo (R42)        Date of Onset: 9/25/22 Date of Service: 10/27/2022  Visit # 1  Scheduled Visits 8  Insurance Authorized visits 20 PPO     PATIENT SUMMARY   Jarett Reed is a 43year old y/o male who presents to therapy today with reports of dizziness  History of current condition: Pt reports that he has had Migraines since 2013. Pt reports that since sept 25, 2022 he has had constant migraines and intermittent dizziness. Pt reports that he was driving home at night and he started to see stars. Everything was really bright. Pt reports that he got home and was nauseous and his headaches got worse and turned into a migraine. Pt reports that he is having blurred vision. Pt reports that closing his eyes he has difficulties balancing. Pt reports that he is having car sickness. Pt reports that he has had 4 panic attacks from driving. Pt reports that he is wearing sunglasses whenever it is bright. Pt reports that he saw an optometrist and he has no issues with his eyes. Pt reports that he saw Dr. Dennie Boxer and was diagnosed migraine with aura. Pt reports that he has a constant headache that eventual goes into a Migraine. Pt reports that he is numerous meds that have not been able to break it. Pt reports no difference since he has been on the meds. Pt reports that his migraines are for 5 hours a day. Pt reports that he did have an MRI. Pt is currently on short-term disability.  Pt works at [employer]    Symptoms with cough/sneeze or loud noise: no  Falls: no  Hx of migraines:  Yes: hx from 2013  Hx of vision issue:  Yes: blurred, double and see spots, clouds  Hx of hearing issues:   No    Dizziness:  Best 0/10, Worst 5/10 , Current 0/10  Quality: intermittent spinning, feels like he is on a boat, lightheaded, gets hot  Gets dizzy 3-4 times a day lasts 5-10 minutes  Aggravates: Exertion, closing eyes, warmth, avoiding quick head turns  Relieves: Not moving, cold    Headache: Best 7/10, Worst 10/10 , Current 7/10   Aggravates: driving, being in a car, watching tv, too much screen time, exertion, waking up in the morning     Relieves: being in the dark    Cervical pain: constant c-spine pain  Best 4/10 , Worst 7/10 , Current 5/10  Waking up in the am (sleeps on his back, 2 pillows)      Dizziness Handicap Inventory [facility]): 46/100      Current functional limitations include waking up in the morning, head turns, driving, light sensitive  Social history:  Pt has 3 kids 2-14. Pt is currently on short-term disability  Prior level of function pt was able to driving and working without difficulties. Pt goals include to get rid of symptoms and get eyes back to normal.  Past medical history was reviewed with [facility]. Significant findings include  has a past medical history of Allergic rhinitis, Depression, and Esophageal reflux. h/o blood clots from covid         ASSESSMENT:      [facility] presents today with poor posture, dec C-spine AROM, poor balance, fair gait and positive convergence testing. Pt. would benefit from skilled Physical Therapy to address the above impairments to dec headache, dizziness symptoms in order to drive, sleep and turn head. Precautions:  None      OBJECTIVE:   Physical Exam:  Posture/Observation: poor B rounded shoulders and forward head, slouched sitting                       Cervical spine ROM: Ofgh278%*, Ext 75%*, R SB 75%, L SB 75% , R %, L ROT 75%       Coordination Testing:   Finger to Nose: WNL  Heel to Shin: WNL      Oculomotor/Vestibular Exam:  Spontaneous Nystagmus:  In light neg In darkness neg  Smooth Pursuit: Negative   Saccades: Negative saw black spots  Convergence: Positive 6 inches   Cover/Uncover: neg, gets double vision  Cross Cover: Negative , gets double vision  Head Thrust: Negative  Gaze Evoked Nystagmus: Negative  Head Shaking Nystagmus: Negative         Postural Control:   Romberg: EO 20 sec, EC 3 sec towards the R    Romberg on Foam: EO 10 sec mod sway, EC 3 sec   Tandem Stance: R back EO 10 sec L back EO 10 sec   SLS: R EO 7 sec, L EO 10 sec    Functional Mobility:  Functional Mobility/Gait:slow gait with dec B UE swing      Today's Treatment and Response:   Pt education was provided on exam findings, treatment diagnosis, treatment plan, expectations, and prognosis. Patient was instructed in and issued a HEP for: meditation, appropriate responses to exercising  Theract x 8 min  Meditation(written lists of where to find)  Discussion of appropriate pain/dizzy levels when walking or doing activities  Charges: PT Eval x1(Moderate), 1 theract      Total Timed Treatment: 10 min     Total Treatment Time: 40 min     Based on clinical rationale and outcome measures, this evaluation involved Moderate Complexity decision making due to 3+ personal factors/comorbidities, 4+ body structures involved/activity limitations, and evolving symptoms including changing dizziness symptoms           PLAN OF CARE:    Goals: To be met in 4-6 weeks   1. Pt. to be independent home exercise program, post treatment instructions to allow patients safe return to return to walking in community. 2.Pt. to be able to look under bed without c/o vertigo with 3/3 trials. 3.Pt. to improve static/dynamic balance, gait, functional activities with challenges of head turns to allow for community ambulation. 4.Pt to report 50% or more dec in symptoms while waking up in the morning. 5. Pt to report ability to drive without symptoms. Frequency / Duration: Patient will be seen for 1-2 x/week or a total of 20 visits over a 90 day period.   Treatment will include: Home exercise program development and instruction, Patient/family education, Balance training, Canalith Repositioning Maneuver, Eye/head coordination exercises, Sensory organization training, Optokinetic stimulation, Strengthening, ROM, Exertion training; Gait Training; Manual Therapy;     Education or treatment limitation: None  Rehab Potential: fair    Patient was advised of these findings, precautions, and treatment options and has agreed to actively participate in planning and for this course of care. Thank you for your referral.  If you have any questions, please contact me at Dept: [phone]    Sincerely,  Electronically signed by therapist: Eric Cortez PT, DPT, cert MDT      [de-identified] certification required: Yes  I certify the need for these services furnished under this plan of treatment and while under my care.     X___________________________________________________ Date____________________    Certification From: 30/10/9329  To:1/25/2023

## 2022-11-02 ENCOUNTER — OFFICE VISIT (OUTPATIENT)
Dept: NEUROLOGY | Facility: CLINIC | Age: 42
End: 2022-11-02
Payer: COMMERCIAL

## 2022-11-02 ENCOUNTER — OFFICE VISIT (OUTPATIENT)
Dept: PHYSICAL THERAPY | Age: 42
End: 2022-11-02
Attending: Other
Payer: COMMERCIAL

## 2022-11-02 VITALS — WEIGHT: 249 LBS | HEIGHT: 70 IN | BODY MASS INDEX: 35.65 KG/M2

## 2022-11-02 DIAGNOSIS — H53.2 DIPLOPIA: ICD-10-CM

## 2022-11-02 DIAGNOSIS — G43.109 MIGRAINE WITH AURA AND WITHOUT STATUS MIGRAINOSUS, NOT INTRACTABLE: Primary | ICD-10-CM

## 2022-11-02 PROCEDURE — 97116 GAIT TRAINING THERAPY: CPT | Performed by: PHYSICAL THERAPIST

## 2022-11-02 PROCEDURE — 97110 THERAPEUTIC EXERCISES: CPT | Performed by: PHYSICAL THERAPIST

## 2022-11-02 PROCEDURE — 97112 NEUROMUSCULAR REEDUCATION: CPT | Performed by: PHYSICAL THERAPIST

## 2022-11-02 PROCEDURE — 3008F BODY MASS INDEX DOCD: CPT | Performed by: OTHER

## 2022-11-02 PROCEDURE — 99214 OFFICE O/P EST MOD 30 MIN: CPT | Performed by: OTHER

## 2022-11-02 PROCEDURE — 97140 MANUAL THERAPY 1/> REGIONS: CPT | Performed by: PHYSICAL THERAPIST

## 2022-11-02 RX ORDER — TOPIRAMATE 25 MG/1
TABLET ORAL
Qty: 90 TABLET | Refills: 1 | Status: SHIPPED | OUTPATIENT
Start: 2022-11-02

## 2022-11-02 NOTE — PROGRESS NOTES
Dx: Migraine with aura and without status migrainosus, not intractable (G43.109)  Vertigo (R42)         Insurance (Authorized # of Visits):  20 PPO           Authorizing Physician: Dr. Ru Huerta MD visit: 11/2/22  Fall Risk: standard         Precautions: n/a         Evaluation Date: 10/27/22  Previous Level of Function: pt was able to driving and working without difficulties    Subjective: Pt reports that it was bad driving here this morning(pt was a passenger). Pt showed pictures of what he saw in the morning driving here. It was a hallow effect. Pt reports that his neck bothered him after last visit. Pt reports that he is having a constant headache with migraines for an hour 4-5 times a day. Pain Headache  6/10; C-spine 4-5/10  Objective:  Functional Gait Assessment   Item Description Score (0 worst, 3 best)    ___1____1. Gait Level Surface-  Time: _____8.12_____seconds   3) Normal--Walks 6 m (20 ft) in less than 5.5 seconds, no assistive devices, good speed, no evidence for imbalance, normal gait pattern, deviates no more than 15.24 cm (6 in) outside of the  30.48-cm (12-in) walkway width. (2) Mild impairment--Walks 6 m (20 ft) in less than 7 seconds but  greater than 5.5 seconds, uses assistive device, slower speed, mild gait deviations, or deviates 15.24-25.4 cm (6-10 in) outside of the 30.48-cm (12-in) walkway width. (1) Moderate impairment--Walks 6 m (20 ft), slow speed, abnormal gait pattern, evidence for imbalance, or deviates 25.4-38.1 cm (10-15 in) outside of the 30.48-cm (12-in) walkway width. Requires more than 7 seconds to ambulate 6 m (20 ft). (0) Severe impairment--Cannot walk 6 m (20 ft) without assistance, severe gait deviations or imbalance, deviates greater than 38.1  cm (15 in) outside of the 30.48-cm  ___3____2. Change in gait speed 3) Normal--Able to smoothly change walking speed without loss of balance or gait deviation.  Shows a significant difference in walking speeds between normal, fast, and slow speeds. Deviates no more than 15.24 cm (6 in) outside of the 30.48-cm(12-in) walkway width. (2) Mild impairment--Is able to change speed but demonstrates mild gait deviations, deviates 15.24-25.4 cm (6-10 in) outside of the 30.48-cm (12-in) walkway width, or no gait deviations but unable to achieve a significant change in velocity, or uses an assistive device. (1) Moderate impairment--Makes only minor adjustments to walking speed, or accomplishes a change in speed with significant gait deviations, deviates 25.4-38.1 cm (10-15 in) outside the 30.48-cm (12-in) walkway width, or changes speed but loses  balance but is able to recover and continue walking. (0) Severe impairment--Cannot change speeds, deviates greater than 38.1 cm (15 in) outside 30.48-cm (12-in) walkway width, or loses balance and has to reach for wall or be caught.  ___2____3. Gait with horizontal head turns  (3) Normal--Performs head turns smoothly with no change in gait. Deviates no more than 15.24 cm (6 in) outside 30.48-cm (12-in)walkway width. (2) Mild impairment--Performs head turns smoothly with slight change in gait velocity (eg, minor disruption to smooth gait path), deviates 15.24-25.4 cm (6-10 in) outside 30.48-cm (12-in) walkway width, or uses an assistive device. (1) Moderate impairment--Performs head turns with moderate change in gait velocity, slows down, deviates 25.4-38.1 cm (10-15 in) outside 30.48-cm (12-in) walkway width but recovers,  can continue to walk. (0) Severe impairment--Performs task with severe disruption of gait (eg, staggers 38.1 cm [15 in] outside 30.48-cm (12-in) walkway width, loses balance, stops, or reaches for wall). ___0____4. Gait with vertical head turns 3) Normal--Performs head turns with no change in gait. Deviates no more than 15.24 cm (6 in) outside 30.48-cm (12-in) walkway width.  (2) Mild impairment--Performs task with slight change in gait velocity (eg, minor disruption to smooth gait path), deviates 15.24-25.4 cm (6-10 in) outside 30.48-cm (12-in) walkway width or uses assistive device. (1) Moderate impairment--Performs task with moderate change in gait velocity, slows down, deviates 25.4-38.1 cm (10-15 in) outside 30.48-cm (12-in) walkway width but recovers, can continue to walk. (0) Severe impairment--Performs task with severe disruption of gait (eg, staggers 38.1 cm [15 in] outside 30.48-cm (12-in) walkway width, loses balance, stops, reaches for wall). ___2____5. Gait and pivot turn (3) Normal--Pivot turns safely within 3 seconds and stops quicklywith no loss of balance. (2) Mild impairment--Pivot turns safely in 3 seconds and stops with no loss of balance, or pivot turns safely within 3 seconds and stops with mild imbalance, requires small steps to catch Balance. (1) Moderate impairment--Turns slowly, requires verbal cueing, or requires several small steps to catch balance following turn and stop. (0) Severe impairment--Cannot turn safely, requires assistance to turn and stop.  ___3____6. Step over obstacle (3) Normal--Is able to step over 2 stacked shoe boxes taped together (22.86 cm [9 in] total height) without changing gait speed; no evidence of imbalance. (2) Mild impairment--Is able to step over one shoe box (11.43 cm [4.5 in] total height) without changing gait speed; no evidence of imbalance. (1) Moderate impairment--Is able to step over one shoe box (11.43  cm [4.5 in] total height) but must slow down and adjust steps to clear box safely. May require verbal cueing.(0) Severe impairment--Cannot perform without assistance.  ___1____7. Gait with narrow base of support-  # of steps___6_____ (3) Normal--Is able to ambulate for 10 steps heel to toe with no staggering. (2) Mild impairment--Ambulates 7-9 steps. (1) Moderate impairment--Ambulates 4-7 steps. (0) Severe impairment--Ambulates less than 4 steps heel to toe orcannot perform without assistance.  ___1____8.  Gait with eyes closed-  Time: __12.73__seconds (3) Normal--Walks 6 m (20 ft), no assistive devices, good speed, no evidence of imbalance, normal gait pattern, deviates no more than 15.24 cm (6 in) outside 30.48-cm (12-in) walkway width. Ambulates 6 m (20 ft) in less than 7 seconds. (2) Mild impairment--Walks 6 m (20 ft), uses assistive device, slower speed, mild gait deviations, deviates 15.24-25.4 cm (6-10 in) outside 30.48-cm (12-in) walkway width. Ambulates 6 m (20 ft) in less than 9 seconds but greater than 7 seconds. (1) Moderate impairment--Walks 6 m (20 ft), slow speed, abnormal  gait pattern, evidence for imbalance, deviates 25.4-38.1 cm (10-15 in) outside 30.48-cm (12-in) walkway width. Requires more than 9 seconds to ambulate 6 m (20 ft). (0) Severe impairment--Cannot walk 6 m (20 ft) without assistance, severe gait deviations or imbalance, deviates greater than 38.1 cm (15 in) outside 30.48-cm (12-in) walkway width or will not attempt task.  ___3_9. Ambulating backward (3) Normal--Walks 6 m (20 ft), no assistive devices, good peed,  no evidence for imbalance, normal gait pattern, deviates no more than 15.24 cm (6 in) outside 30.48-cm (12-in) walkway width. (2) Mild impairment--Walks 6 m (20 ft), uses assistive device, slower speed, mild gait deviations, deviates 15.24-25.4 cm (6-10 in) outside 30.48-cm (12-in) walkway width. (1) Moderate impairment--Walks 6 m (20 ft), slow speed, abnormal gait pattern, evidence for imbalance, deviates 25.4-38.1 cm (10-15 in) outside 30.48-cm (12-in) walkway width. (0) Severe impairment--Cannot walk 6 m (20 ft) without assistance, severe gait deviations or imbalance, deviates greater than 38.1 cm (15 in) outside 30.48-cm (12-in) walkway width or will not attempt task.  ___2____10. Steps (3) Normal--Alternating feet, no rail. (2) Mild impairment--Alternating feet, must use rail. (1) Moderate impairment--Two feet to a stair; must use rail. (0) Severe impairment--Cannot do safely.     TOTAL SCORE: ___18____/30    (less than 22/30 = fall risk)          Assessment: Assessed FGA and pt has dec score and is a fall risk. Goals:   Goals: To be met in 4-6 weeks   1. Pt. to be independent home exercise program, post treatment instructions to allow patients safe return to return to walking in community. 2.Pt. to be able to look under bed without c/o vertigo with 3/3 trials. 3.Pt. to improve static/dynamic balance, gait, functional activities with challenges of head turns to allow for community ambulation. 4.Pt to report 50% or more dec in symptoms while waking up in the morning. 5. Pt to report ability to drive without symptoms. Plan: Cont PT per plan of care   Date: 11/2/2022  TX#: 2/20 Date:                 TX#: 3/20 Date:                 TX#: 4/20 Date:                 TX#: 5/20 Date:    Tx#: 6/20   There ex Total time: 10 min  Seated C-spine retraction 2 x10 (NE)  Nustep L3 UE/LE x 5min  FOTO     Neuro Time: 10 min  Seated VOR cancel with ball hor/vertical x20  Pencil push ups x5  Broch string x 1 min         Manual STM to B traps x 10 min       gait Total: x 10 min  FGA see above       HEP: 11/2/22 VOR cancellation, pencil push ups (see handout)     Charges: 1 therex, 1 man, 1 gait, 1 neuro       Total Timed Treatment: 42 min  Total Treatment Time: 42 min

## 2022-11-02 NOTE — PATIENT INSTRUCTIONS
[facility], [address] [place] [address]  Ph: [phone]

## 2022-11-07 ENCOUNTER — HOSPITAL ENCOUNTER (OUTPATIENT)
Dept: GENERAL RADIOLOGY | Facility: HOSPITAL | Age: 42
Discharge: HOME | End: 2022-11-07
Attending: INTERNAL MEDICINE
Payer: COMMERCIAL

## 2022-11-07 DIAGNOSIS — M54.12 CERVICAL RADICULOPATHY: ICD-10-CM

## 2022-11-07 PROCEDURE — 72040 X-RAY EXAM NECK SPINE 2-3 VW: CPT | Performed by: INTERNAL MEDICINE

## 2022-11-09 ENCOUNTER — OFFICE VISIT (OUTPATIENT)
Dept: PHYSICAL THERAPY | Age: 42
End: 2022-11-09
Attending: INTERNAL MEDICINE
Payer: COMMERCIAL

## 2022-11-09 PROCEDURE — 97140 MANUAL THERAPY 1/> REGIONS: CPT | Performed by: PHYSICAL THERAPIST

## 2022-11-09 PROCEDURE — 97110 THERAPEUTIC EXERCISES: CPT | Performed by: PHYSICAL THERAPIST

## 2022-11-09 PROCEDURE — 97112 NEUROMUSCULAR REEDUCATION: CPT | Performed by: PHYSICAL THERAPIST

## 2022-11-09 NOTE — PROGRESS NOTES
Dx: Migraine with aura and without status migrainosus, not intractable (G43.109)  Vertigo (R42)         Insurance (Authorized # of Visits):  21 PPO           Authorizing Physician: Dr. Adry Huerta MD visit: 11/2/22  Fall Risk: standard         Precautions: n/a         Evaluation Date: 10/27/22  Previous Level of Function: pt was able to driving and working without difficulties    Subjective: Pt reports that he saw the MD and the MD gave him additional meds. Pt reports that he was referred to vision therapy. Pt reports that since starting the meds, the headaches and the Migraines are about 1-2 times a day. Pt reports that his eyes are still out of whack. Pt reports that he is still unable to drive. Pt reports compliance with HEP. Pain Headache  5/10; C-spine 4/10; 0/10 dizziness  Objective:  Nustep symptoms at 8 min        Assessment: Advanced HEP to dec symptoms. Goals:   Goals: To be met in 4-6 weeks   1. Pt. to be independent home exercise program, post treatment instructions to allow patients safe return to return to walking in community. 2.Pt. to be able to look under bed without c/o vertigo with 3/3 trials. 3.Pt. to improve static/dynamic balance, gait, functional activities with challenges of head turns to allow for community ambulation. 4.Pt to report 50% or more dec in symptoms while waking up in the morning. 5. Pt to report ability to drive without symptoms. Plan: Cont PT per plan of care   Date: 11/2/2022  TX#: 2/20 Date: 11/9/22                 TX#: 3/20 Date:                 TX#: 4/20 Date:                 TX#: 5/20 Date:    Tx#: 6/20   There ex Total time: 10 min  Seated C-spine retraction 2 x10 (NE)  Nustep L3 UE/LE x 5min Total time: 25 min  Seated C-spine retraction 2 x10 (NE)  Nustep L4 UE/LE x 10min (symptoms at 8 min)  Seated grounding into chair 10sec x10  Wall push ups x20  RTB rows/ext 5sec x20 FOTO     Neuro Time: 10 min  Seated VOR cancel with ball hor/vertical x20  Pencil push ups x5  Broch string x 1 min   Time: 10 min  Seated VOR cancel with ball hor/vertical x20  Pencil push ups x5  Broch string x 1 min        Manual STM to B traps x 10 min STM to B traps x 10 min      gait Total: x 10 min  FGA see above       HEP: 11/9/22 seated grounding  11/2/22 VOR cancellation, pencil push ups (see handout)     Charges: 2 therex, 1 man, 1 neuro       Total Timed Treatment: 46 min  Total Treatment Time: 46 min

## 2022-11-15 ENCOUNTER — OFFICE VISIT (OUTPATIENT)
Dept: PHYSICAL THERAPY | Age: 42
End: 2022-11-15
Attending: INTERNAL MEDICINE
Payer: COMMERCIAL

## 2022-11-15 PROCEDURE — 97110 THERAPEUTIC EXERCISES: CPT | Performed by: PHYSICAL THERAPIST

## 2022-11-15 PROCEDURE — 97140 MANUAL THERAPY 1/> REGIONS: CPT | Performed by: PHYSICAL THERAPIST

## 2022-11-15 PROCEDURE — 97112 NEUROMUSCULAR REEDUCATION: CPT | Performed by: PHYSICAL THERAPIST

## 2022-11-15 NOTE — PROGRESS NOTES
Dx: Migraine with aura and without status migrainosus, not intractable (G43.109)  Vertigo (R42)         Insurance (Authorized # of Visits):  21 PPO           Authorizing Physician: Dr. Shaheen Huerta MD visit: 11/2/22  Fall Risk: standard         Precautions: n/a         Evaluation Date: 10/27/22  Previous Level of Function: pt was able to driving and working without difficulties    Subjective: Pt reports that he woke up without a headache this morning. Pt reports that while driving here he had increased sensitivity to light and developed a headache. Pt reports that he is going to get vision therapy on Nov 2022. Pain Headache  7/10; C-spine 4/10; mild dizziness  Objective:  DHI 46/100        Assessment: Decreased convergence exercises secondary to increased headache symptoms. Goals:   Goals: To be met in 4-6 weeks   1. Pt. to be independent home exercise program, post treatment instructions to allow patients safe return to return to walking in community. 2.Pt. to be able to look under bed without c/o vertigo with 3/3 trials. 3.Pt. to improve static/dynamic balance, gait, functional activities with challenges of head turns to allow for community ambulation. 4.Pt to report 50% or more dec in symptoms while waking up in the morning. 5. Pt to report ability to drive without symptoms. Plan: Cont PT per plan of care   Date: 11/2/2022  TX#: 2/20 Date: 11/9/22                 TX#: 3/20 Date: 11/15/22               TX#: 4/20 Date:                 TX#: 5/20 Date:    Tx#: 6/20   There ex Total time: 10 min  Seated C-spine retraction 2 x10 (NE)  Nustep L3 UE/LE x 5min Total time: 25 min  Seated C-spine retraction 2 x10 (NE)  Nustep L4 UE/LE x 10min (symptoms at 8 min)  Seated grounding into chair 10sec x10  Wall push ups x20  RTB rows/ext 5sec x20 Total time: 27 min  Seated C-spine retraction 2 x10 (NE)  Nustep L4 UE/LE x 10min (symptoms at 8 min)  Seated grounding into chair 10sec x10  Wall push ups x20  RTB rows/ext 5sec x20     Neuro Time: 10 min  Seated VOR cancel with ball hor/vertical x20  Pencil push ups x5  Broch string x 1 min   Time: 10 min  Seated VOR cancel with ball hor/vertical x20  Pencil push ups x5  Broch string x 1 min   Time: 8 min  Seated VOR cancel with ball hor/vertical x20  Ball push-ups x10     Manual STM to B traps x 10 min STM to B traps x 10 min STM to B traps x 10 min     gait Total: x 10 min  FGA see above       HEP: 11/9/22 seated grounding  11/2/22 VOR cancellation, pencil push ups (see handout)     Charges: 2 therex, 1 man, 1 neuro       Total Timed Treatment: 45 min  Total Treatment Time: 45 min

## 2022-11-23 ENCOUNTER — OFFICE VISIT (OUTPATIENT)
Dept: PHYSICAL THERAPY | Age: 42
End: 2022-11-23
Attending: INTERNAL MEDICINE
Payer: COMMERCIAL

## 2022-11-23 PROCEDURE — 97112 NEUROMUSCULAR REEDUCATION: CPT | Performed by: PHYSICAL THERAPIST

## 2022-11-23 PROCEDURE — 97140 MANUAL THERAPY 1/> REGIONS: CPT | Performed by: PHYSICAL THERAPIST

## 2022-11-23 PROCEDURE — 97110 THERAPEUTIC EXERCISES: CPT | Performed by: PHYSICAL THERAPIST

## 2022-11-23 NOTE — PROGRESS NOTES
Dx: Migraine with aura and without status migrainosus, not intractable (G43.109)  Vertigo (R42)         Insurance (Authorized # of Visits):  21 PPO           Authorizing Physician: Dr. Kelsey Huerta MD visit: 11/2/22  Fall Risk: standard         Precautions: n/a         Evaluation Date: 10/27/22  Previous Level of Function: pt was able to driving and working without difficulties    Subjective: Pt reports that yesterday was the first day he got a migraine in a few days. Pt reports that it was because he was driving. Pt reports that he had no choice, but to drive. Pt reports that altogether his headaches have been better. Pt reports that they come and go. Pt reports that he starts vision therapy next week. Pain Headache  7/10; C-spine 3/10; Dizziness: 4/10  Objective:  SEE BELOW        Assessment: Added gaze stab with 2 X's and cervical rot. Pt reported increased symptoms afterwards. Goals:   Goals: To be met in 4-6 weeks   1. Pt. to be independent home exercise program, post treatment instructions to allow patients safe return to return to walking in community. 2.Pt. to be able to look under bed without c/o vertigo with 3/3 trials. 3.Pt. to improve static/dynamic balance, gait, functional activities with challenges of head turns to allow for community ambulation. 4.Pt to report 50% or more dec in symptoms while waking up in the morning. 5. Pt to report ability to drive without symptoms. Plan: Cont PT per plan of care   Date: 11/2/2022  TX#: 2/20 Date: 11/9/22                 TX#: 3/20 Date: 11/15/22               TX#: 4/20 Date: 11/23/22               TX#: 5/20 Date:    Tx#: 6/20   There ex Total time: 10 min  Seated C-spine retraction 2 x10 (NE)  Nustep L3 UE/LE x 5min Total time: 25 min  Seated C-spine retraction 2 x10 (NE)  Nustep L4 UE/LE x 10min (symptoms at 8 min)  Seated grounding into chair 10sec x10  Wall push ups x20  RTB rows/ext 5sec x20 Total time: 27 min  Seated C-spine retraction 2 x10 (NE)  Nustep L4 UE/LE x 10min (symptoms at 8 min)  Seated grounding into chair 10sec x10  Wall push ups x20  RTB rows/ext 5sec x20 Total time: 27 min  Seated C-spine retraction 2 x10 (NE)  Nustep L4 UE/LE x 6 min   Seated grounding into chair 10sec x10  Wall push ups x20  RTB rows/ext 5sec x20  Seated abd ball squeeze 5sec x20    Neuro Time: 10 min  Seated VOR cancel with ball hor/vertical x20  Pencil push ups x5  Broch string x 1 min   Time: 10 min  Seated VOR cancel with ball hor/vertical x20  Pencil push ups x5  Broch string x 1 min   Time: 8 min  Seated VOR cancel with ball hor/vertical x20  Ball push-ups x10 Time: 8 min  Seated VOR cancel with ball hor/vertical x20  Ball push-ups x10  2 x's gaze stab followed by C-ROM x10(increased symptoms)    Manual STM to B traps x 10 min STM to B traps x 10 min STM to B traps x 10 min STM to B traps x 10 min    gait Total: x 10 min  FGA see above       HEP: 11/9/22 seated grounding  11/2/22 VOR cancellation, pencil push ups (see handout)     Charges: 2 therex, 1 man, 1 neuro       Total Timed Treatment: 45 min  Total Treatment Time: 45 min

## 2022-11-30 ENCOUNTER — TELEPHONE (OUTPATIENT)
Dept: NEUROLOGY | Facility: CLINIC | Age: 42
End: 2022-11-30

## 2022-11-30 ENCOUNTER — OFFICE VISIT (OUTPATIENT)
Dept: PHYSICAL THERAPY | Age: 42
End: 2022-11-30
Attending: INTERNAL MEDICINE
Payer: COMMERCIAL

## 2022-11-30 PROCEDURE — 97140 MANUAL THERAPY 1/> REGIONS: CPT | Performed by: PHYSICAL THERAPIST

## 2022-11-30 PROCEDURE — 97110 THERAPEUTIC EXERCISES: CPT | Performed by: PHYSICAL THERAPIST

## 2022-11-30 PROCEDURE — 97112 NEUROMUSCULAR REEDUCATION: CPT | Performed by: PHYSICAL THERAPIST

## 2022-11-30 NOTE — TELEPHONE ENCOUNTER
Received attending MD statement request paperwork from [employer]. Paperwork completed & placed on Dr Arnold key for review & signature.

## 2022-12-01 NOTE — TELEPHONE ENCOUNTER
Completed forms faxed to Jose Jacobson at [facility] at [phone]. Copy sent to scanning.

## 2022-12-02 ENCOUNTER — OFFICE VISIT (OUTPATIENT)
Dept: NEUROLOGY | Facility: CLINIC | Age: 42
End: 2022-12-02
Payer: COMMERCIAL

## 2022-12-02 VITALS — BODY MASS INDEX: 34.22 KG/M2 | HEIGHT: 70 IN | WEIGHT: 239 LBS

## 2022-12-02 DIAGNOSIS — H53.2 DIPLOPIA: ICD-10-CM

## 2022-12-02 DIAGNOSIS — G43.109 MIGRAINE WITH AURA AND WITHOUT STATUS MIGRAINOSUS, NOT INTRACTABLE: Primary | ICD-10-CM

## 2022-12-02 PROCEDURE — 3008F BODY MASS INDEX DOCD: CPT | Performed by: OTHER

## 2022-12-02 PROCEDURE — 99214 OFFICE O/P EST MOD 30 MIN: CPT | Performed by: OTHER

## 2022-12-02 RX ORDER — TOPIRAMATE 25 MG/1
TABLET ORAL
Qty: 150 TABLET | Refills: 1 | Status: SHIPPED | OUTPATIENT
Start: 2022-12-02

## 2022-12-07 ENCOUNTER — TELEPHONE (OUTPATIENT)
Dept: PHYSICAL THERAPY | Facility: HOSPITAL | Age: 42
End: 2022-12-07

## 2022-12-07 ENCOUNTER — APPOINTMENT (OUTPATIENT)
Dept: PHYSICAL THERAPY | Age: 42
End: 2022-12-07
Attending: INTERNAL MEDICINE
Payer: COMMERCIAL

## 2022-12-14 ENCOUNTER — OFFICE VISIT (OUTPATIENT)
Dept: PHYSICAL THERAPY | Age: 42
End: 2022-12-14
Attending: INTERNAL MEDICINE
Payer: COMMERCIAL

## 2022-12-14 PROCEDURE — 97140 MANUAL THERAPY 1/> REGIONS: CPT | Performed by: PHYSICAL THERAPIST

## 2022-12-14 PROCEDURE — 97110 THERAPEUTIC EXERCISES: CPT | Performed by: PHYSICAL THERAPIST

## 2022-12-14 NOTE — PROGRESS NOTES
Dx: Migraine with aura and without status migrainosus, not intractable (G43.109)  Vertigo (R42)         Insurance (Authorized # of Visits):  21 PPO           Authorizing Physician: Dr. Gabriel Mccarthy  Next MD visit: 11/2/22  Fall Risk: standard         Precautions: n/a         Evaluation Date: 10/27/22  Previous Level of Function: pt was able to driving and working without difficulties    Subjective: Pt arrived 20 min late for today's visit. Pt reports that he is starting vision therapy Jan 4, 2023. Pt reports that he saw Dr. Gabriel Mccarthy and they increased in his meds. Pt reports that it feels okay. Pt reports that driving is better, the lights are not as bad, but stop and go is an issue. Pt reports night driving continues to be difficult. Pt reports that he did have an migraine on Sunday. Pt reports that he took his child on a merry go round yesterday and he got very dizzy  Pain Headache  3/10; C-spine 2/10; Dizziness: 0/10  Objective:  SEE BELOW        Assessment: Today's session was limited secondary to pt arriving late. Discussed avoiding merry-go-round, but if pt does have to go on, discussed strategies to dec symptoms. Goals:   Goals: To be met in 4-6 weeks   1. Pt. to be independent home exercise program, post treatment instructions to allow patients safe return to return to walking in community. 2.Pt. to be able to look under bed without c/o vertigo with 3/3 trials. 3.Pt. to improve static/dynamic balance, gait, functional activities with challenges of head turns to allow for community ambulation. 4.Pt to report 50% or more dec in symptoms while waking up in the morning. 5. Pt to report ability to drive without symptoms. Plan: Cont PT per plan of care; pt to schedule more visits   Date: 12/14/2022  TX#: 7/20 Date:                  TX#: 8/20 Date:                TX#: 9/20 Date:              TX#: 10/20 Date:     Tx#: 11/20   There ex Total time: 15 min  Standing SB push down 5sec x15  Wall push ups x20  RTB rows/ext 5sec x20  Standing 1 UE march x17 (started seeing dots)   Seated add ball squeeze LE 5sec x20  Seated UE add squeeze 5sec x20       Neuro Seated VOR cancel with ball x15 ea   Time: 10 min  Seated VOR cancel with ball hor/vertical x20  Pencil push ups x5  Broch string x 1 min   Time: 8 min  Seated VOR cancel with ball hor/vertical x20  Ball push-ups x10 Time: 8 min  Seated VOR cancel with ball hor/vertical x20  Ball push-ups x10  2 x's gaze stab followed by C-ROM x10(increased symptoms) Time: 10 min  Seated VOR cancel with ball hor/vertical x20  Airex march x20 B UE support (started to have symptoms)  Airex abd x20 B UE support (started to have symptoms     Manual STM to B traps x 10 min STM to B traps x 10 min STM to B traps x 10 min STM to B traps x 10 min STM to B traps x 10 min   gait        HEP: 11/9/22 seated grounding  11/2/22 VOR cancellation, pencil push ups (see handout)     Charges: 1 therex, 1 man,        Total Timed Treatment: 25 min  Total Treatment Time: 25 min

## 2022-12-21 ENCOUNTER — OFFICE VISIT (OUTPATIENT)
Dept: PHYSICAL THERAPY | Age: 42
End: 2022-12-21
Attending: INTERNAL MEDICINE
Payer: COMMERCIAL

## 2022-12-21 DIAGNOSIS — G43.109 MIGRAINE WITH AURA AND WITHOUT STATUS MIGRAINOSUS, NOT INTRACTABLE: Primary | ICD-10-CM

## 2022-12-21 PROCEDURE — 97110 THERAPEUTIC EXERCISES: CPT | Performed by: PHYSICAL THERAPIST

## 2022-12-21 PROCEDURE — 97112 NEUROMUSCULAR REEDUCATION: CPT | Performed by: PHYSICAL THERAPIST

## 2022-12-21 PROCEDURE — 97140 MANUAL THERAPY 1/> REGIONS: CPT | Performed by: PHYSICAL THERAPIST

## 2022-12-21 RX ORDER — CALCIUM CARBONATE 300MG(750)
400 TABLET,CHEWABLE ORAL 2 TIMES DAILY
Qty: 10 TABLET | Refills: 0 | OUTPATIENT
Start: 2022-12-21

## 2022-12-21 NOTE — PROGRESS NOTES
Dx: Migraine with aura and without status migrainosus, not intractable (G43.109)  Vertigo (R42)         Insurance (Authorized # of Visits):  21 PPO           Authorizing Physician: Dr. Kelsey Huerta MD visit: 11/2/22  Fall Risk: standard         Precautions: n/a         Evaluation Date: 10/27/22  Previous Level of Function: pt was able to driving and working without difficulties    Subjective: Pt reports that he woke up with headache and neck pain of 8/10. Pt reports that it has gone down to a 5/10. Pt reports that he did a lot yesterday. He had a migraine 2 days ago after driving at night. Pain Headache  5/10; C-spine 5/10; Dizziness: 0/10  Objective:  SEE BELOW        Assessment: pt was able to perform standing airex march and abd without symptoms today. Goals:   Goals: To be met in 4-6 weeks   1. Pt. to be independent home exercise program, post treatment instructions to allow patients safe return to return to walking in community. 2.Pt. to be able to look under bed without c/o vertigo with 3/3 trials. 3.Pt. to improve static/dynamic balance, gait, functional activities with challenges of head turns to allow for community ambulation. 4.Pt to report 50% or more dec in symptoms while waking up in the morning. 5. Pt to report ability to drive without symptoms. Plan: Cont PT per plan of care;    Date: 12/14/2022  TX#: 7/20 Date:  12/21/22              TX#: 8/20 Date:                TX#: 9/20 Date:              TX#: 10/20 Date:     Tx#: 11/20   There ex Total time: 15 min  Standing SB push down 5sec x15  Wall push ups x20  RTB rows/ext 5sec x20  Standing 1 UE march x17 (started seeing dots)   Seated add ball squeeze LE 5sec x20  Seated UE add squeeze 5sec x20 Total time: 15 min  Nustep UE/LE L4 x 7min (feeling symptoms)  Standing SB push down 5sec x15  Seated add ball squeeze LE 5sec x20      Neuro Seated VOR cancel with ball x15 ea   Time: 15 min  Seated VOR cancel with ball x15 ea  Airex march x20 B UE support (started to have symptoms)  Airex abd x20 B UE support (started to have symptoms Time: 8 min  Seated VOR cancel with ball hor/vertical x20  Ball push-ups x10 Time: 8 min  Seated VOR cancel with ball hor/vertical x20  Ball push-ups x10  2 x's gaze stab followed by C-ROM x10(increased symptoms) Time: 10 min  Seated VOR cancel with ball hor/vertical x20  Airex march x20 B UE support   Airex abd x20 B UE support      Manual STM to B traps x 10 min STM to B traps x 10 min STM to B traps x 10 min STM to B traps x 10 min STM to B traps x 10 min   gait        HEP: 11/9/22 seated grounding  11/2/22 VOR cancellation, pencil push ups (see handout)     Charges: 1 therex, 1 man, 1 neuro        Total Timed Treatment: 40 min  Total Treatment Time: 40 min

## 2022-12-26 ENCOUNTER — HOSPITAL ENCOUNTER (OUTPATIENT)
Dept: MRI IMAGING | Facility: HOSPITAL | Age: 42
Discharge: HOME | End: 2022-12-26
Attending: Other
Payer: COMMERCIAL

## 2022-12-26 DIAGNOSIS — H53.2 DIPLOPIA: ICD-10-CM

## 2022-12-26 PROCEDURE — A9575 INJ GADOTERATE MEGLUMI 0.1ML: HCPCS | Performed by: OTHER

## 2022-12-26 PROCEDURE — 70546 MR ANGIOGRAPH HEAD W/O&W/DYE: CPT | Performed by: OTHER

## 2022-12-26 RX ORDER — GADOTERATE MEGLUMINE 376.9 MG/ML
20 INJECTION INTRAVENOUS
Status: COMPLETED | OUTPATIENT
Start: 2022-12-26 | End: 2022-12-26

## 2022-12-26 RX ADMIN — GADOTERATE MEGLUMINE 20 ML: 376.9 INJECTION INTRAVENOUS at 15:17:00

## 2022-12-27 ENCOUNTER — TELEPHONE (OUTPATIENT)
Dept: NEUROLOGY | Facility: CLINIC | Age: 42
End: 2022-12-27

## 2022-12-27 NOTE — TELEPHONE ENCOUNTER
----- Message from Cleavon Dubin, MD sent at 12/27/2022  7:10 AM CST -----  Please let patient know that MRV brain didn't show significant abnormalities.

## 2022-12-28 ENCOUNTER — APPOINTMENT (OUTPATIENT)
Dept: PHYSICAL THERAPY | Age: 42
End: 2022-12-28
Attending: INTERNAL MEDICINE
Payer: COMMERCIAL

## 2022-12-29 ENCOUNTER — OFFICE VISIT (OUTPATIENT)
Dept: PHYSICAL THERAPY | Age: 42
End: 2022-12-29
Attending: Other
Payer: COMMERCIAL

## 2022-12-29 PROCEDURE — 97110 THERAPEUTIC EXERCISES: CPT | Performed by: PHYSICAL THERAPIST

## 2022-12-29 PROCEDURE — 97140 MANUAL THERAPY 1/> REGIONS: CPT | Performed by: PHYSICAL THERAPIST

## 2022-12-29 PROCEDURE — 97112 NEUROMUSCULAR REEDUCATION: CPT | Performed by: PHYSICAL THERAPIST

## 2022-12-29 NOTE — PROGRESS NOTES
Dx: Migraine with aura and without status migrainosus, not intractable (G43.109)  Vertigo (R42)         Insurance (Authorized # of Visits):  20 PPO           Authorizing Physician: Dr. Milo Huerta MD visit: 11/2/22  Fall Risk: standard         Precautions: n/a         Evaluation Date: 10/27/22  Previous Level of Function: pt was able to driving and working without difficulties    Subjective: Pt reports that he reports that the holidays he did a a lot of driving and threw up a few times. Pt reports that he is currently nauseous from the ride in here today. Pt reports that the headaches and neck pain have definitely improved. Pt reports vision therapy starts next week. Pain Headache  2/10; C-spine 0/10; Dizziness: mild  Objective:  SEE BELOW        Assessment: Added optokinetic stimulation and provided handout for doing it at home. Goals:   Goals: To be met in 4-6 weeks   1. Pt. to be independent home exercise program, post treatment instructions to allow patients safe return to return to walking in community. 2.Pt. to be able to look under bed without c/o vertigo with 3/3 trials. 3.Pt. to improve static/dynamic balance, gait, functional activities with challenges of head turns to allow for community ambulation. 4.Pt to report 50% or more dec in symptoms while waking up in the morning. 5. Pt to report ability to drive without symptoms. Plan: Cont PT per plan of care;    Date: 12/14/2022  TX#: 7/20 Date:  12/21/22              TX#: 8/20 Date: 12/29/22               TX#: 9/20 Date:              TX#: 10/20 Date:     Tx#: 11/20   There ex Total time: 15 min  Standing SB push down 5sec x15  Wall push ups x20  RTB rows/ext 5sec x20  Standing 1 UE march x17 (started seeing dots)   Seated add ball squeeze LE 5sec x20  Seated UE add squeeze 5sec x20 Total time: 15 min  Nustep UE/LE L4 x 7min (feeling symptoms)  Standing SB push down 5sec x15  Seated add ball squeeze LE 5sec x20 Total time: 8 min  Standing SB push down 5sec x20  Seated add UE/LE ball squeeze LE 5sec x20 ea     Neuro Seated VOR cancel with ball x15 ea   Time: 15 min  Seated VOR cancel with ball x15 ea  Airex march x20 B UE support (started to have symptoms)  Airex abd x20 B UE support (started to have symptoms Time: 10 min  Seated VOR cancel with ball x15 ea  Optokinetic stim x 3 min  Airex march x3 started to have symptoms   Time: 8 min  Seated VOR cancel with ball hor/vertical x20  Ball push-ups x10  2 x's gaze stab followed by C-ROM x10(increased symptoms) Time: 8 min  Seated VOR cancel with ball hor/vertical x20  Airex march x20 B UE support        Manual STM to B traps x 10 min STM to B traps x 10 min STM to B traps x 10 min STM to B traps x 10 min STM to B traps x 10 min   gait        HEP: 12/29/22 optokinetic stim handout  11/9/22 seated grounding  11/2/22 VOR cancellation, pencil push ups (see handout)     Charges: 1 therex, 1 man, 1 neuro        Total Timed Treatment: 28  min  Total Treatment Time: 35 min

## 2023-01-03 ENCOUNTER — OFFICE VISIT (OUTPATIENT)
Dept: PHYSICAL THERAPY | Age: 43
End: 2023-01-03
Attending: INTERNAL MEDICINE
Payer: COMMERCIAL

## 2023-01-03 PROCEDURE — 97110 THERAPEUTIC EXERCISES: CPT | Performed by: PHYSICAL THERAPIST

## 2023-01-03 PROCEDURE — 97112 NEUROMUSCULAR REEDUCATION: CPT | Performed by: PHYSICAL THERAPIST

## 2023-01-03 PROCEDURE — 97140 MANUAL THERAPY 1/> REGIONS: CPT | Performed by: PHYSICAL THERAPIST

## 2023-01-03 NOTE — PROGRESS NOTES
Dx: Migraine with aura and without status migrainosus, not intractable (G43.109)  Vertigo (R42)         Insurance (Authorized # of Visits):  21 PPO           Authorizing Physician: Dr. Boyd Huerta MD visit: 11/2/22  Fall Risk: standard         Precautions: n/a         Evaluation Date: 10/27/22  Previous Level of Function: pt was able to driving and working without difficulties    Subjective: Pt reports that he has a headache of a 6/10. Pt reports that the rain was bothering him while driving in today. Pt reports that he watched a few videos and he had a few symptoms afterwards. Pain Headache  6/10; C-spine 0/10; Dizziness: mild  Objective:  Cervical spine ROM: [id number]%, Ext 100% R %, L SB 75% , R %, L ROT 75%         Assessment: Pt demo's improved C-spine AROM compared to eval.     Goals:   Goals: To be met in 4-6 weeks   1. Pt. to be independent home exercise program, post treatment instructions to allow patients safe return to return to walking in community. 2.Pt. to be able to look under bed without c/o vertigo with 3/3 trials. 3.Pt. to improve static/dynamic balance, gait, functional activities with challenges of head turns to allow for community ambulation. 4.Pt to report 50% or more dec in symptoms while waking up in the morning. 5. Pt to report ability to drive without symptoms. Plan: Cont PT per plan of care; pt is starting vision therapy tomorrow   Date: 12/14/2022  TX#: 7/20 Date:  12/21/22              TX#: 8/20 Date: 12/29/22               TX#: 9/20 Date: 1/3/23             TX#: 10/20 Date:     Tx#: 11/20   There ex Total time: 15 min  Standing SB push down 5sec x15  Wall push ups x20  RTB rows/ext 5sec x20  Standing 1 UE march x17 (started seeing dots)   Seated add ball squeeze LE 5sec x20  Seated UE add squeeze 5sec x20 Total time: 15 min  Nustep UE/LE L4 x 7min (feeling symptoms)  Standing SB push down 5sec x15  Seated add ball squeeze LE 5sec x20 Total time: 8 min  Standing SB push down 5sec x20  Seated add UE/LE ball squeeze LE 5sec x20 ea Total time: 20 min  Standing SB push down 5sec x20  Seated add UE/LE ball squeeze LE 5sec x20 ea  Nustep UE/LE L5 x 10 min    Neuro Seated VOR cancel with ball x15 ea   Time: 15 min  Seated VOR cancel with ball x15 ea  Airex march x20 B UE support (started to have symptoms)  Airex abd x20 B UE support (started to have symptoms Time: 10 min  Seated VOR cancel with ball x15 ea  Optokinetic stim x 3 min  Airex march x3 started to have symptoms   Time: 10 min  Seated VOR cancel with ball x15 ea  March x10        Manual STM to B traps x 10 min STM to B traps x 10 min STM to B traps x 10 min STM to B traps x 10 min STM to B traps x 10 min   gait        HEP: 12/29/22 optokinetic stim handout  11/9/22 seated grounding  11/2/22 VOR cancellation, pencil push ups (see handout)     Charges: 1 therex, 1 man, 1 neuro        Total Timed Treatment: 40 min  Total Treatment Time: 40 min

## 2023-01-04 ENCOUNTER — TELEPHONE (OUTPATIENT)
Dept: PHYSICAL THERAPY | Facility: HOSPITAL | Age: 43
End: 2023-01-04

## 2023-01-10 ENCOUNTER — OFFICE VISIT (OUTPATIENT)
Dept: PHYSICAL THERAPY | Age: 43
End: 2023-01-10
Attending: INTERNAL MEDICINE
Payer: COMMERCIAL

## 2023-01-10 ENCOUNTER — PATIENT MESSAGE (OUTPATIENT)
Dept: NEUROLOGY | Facility: CLINIC | Age: 43
End: 2023-01-10

## 2023-01-10 DIAGNOSIS — R42 VERTIGO: ICD-10-CM

## 2023-01-10 DIAGNOSIS — G43.109 MIGRAINE WITH AURA AND WITHOUT STATUS MIGRAINOSUS, NOT INTRACTABLE: Primary | ICD-10-CM

## 2023-01-10 DIAGNOSIS — H53.2 DIPLOPIA: ICD-10-CM

## 2023-01-10 PROCEDURE — 97112 NEUROMUSCULAR REEDUCATION: CPT | Performed by: PHYSICAL THERAPIST

## 2023-01-10 PROCEDURE — 97110 THERAPEUTIC EXERCISES: CPT | Performed by: PHYSICAL THERAPIST

## 2023-01-10 PROCEDURE — 97140 MANUAL THERAPY 1/> REGIONS: CPT | Performed by: PHYSICAL THERAPIST

## 2023-01-10 NOTE — PROGRESS NOTES
Dx: Migraine with aura and without status migrainosus, not intractable (G43.109)  Vertigo (R42)         Insurance (Authorized # of Visits):  21 PPO           Authorizing Physician: Dr. Kendrick Huerta MD visit: 11/2/22  Fall Risk: standard         Precautions: n/a         Evaluation Date: 10/27/22  Previous Level of Function: pt was able to driving and working without difficulties    Subjective: Pt reports that all of last week was bad. Pt reports that he did vision therapy on Wednesday and they did a lot of testing. Pt reports that he has had a Migraine on and off since. Pt reports that on Sunday he was coming down the bleachers and he fell. Pt reports that yesterday he got up yesterday and he fell into the wall at home. Pt reports that his insurance is changing and he is no longer going to be able to afford coming tot his facility. Pt reports that he would like to finish out remaining visits. Pain Headache  6/10; C-spine 4/10; Dizziness: none  Objective:  Cervical spine ROM: [id number]%, Ext 100% R %, L SB 75% , R %, L ROT 75%         Assessment: Discussed with pt use of blue light glasses. Recommended pt to talk to vision therapist to tell them to take it slower. Recommended pt if he is going to watch optokinetic exercises to sit for 5-10 minutes before getting up. Goals:   Goals: To be met in 4-6 weeks   1. Pt. to be independent home exercise program, post treatment instructions to allow patients safe return to return to walking in community. 2.Pt. to be able to look under bed without c/o vertigo with 3/3 trials. 3.Pt. to improve static/dynamic balance, gait, functional activities with challenges of head turns to allow for community ambulation. 4.Pt to report 50% or more dec in symptoms while waking up in the morning. 5. Pt to report ability to drive without symptoms.      Plan: Cont PT per plan of care; pt to look for another PT in network   Date: 12/14/2022  TX#: 7/20 Date:  12/21/22 TX#: 8/20 Date: 12/29/22               TX#: 9/20 Date: 1/3/23             TX#: 10/20 Date: 1/10/23   Tx#: 11/20   There ex Total time: 15 min  Standing SB push down 5sec x15  Wall push ups x20  RTB rows/ext 5sec x20  Standing 1 UE march x17 (started seeing dots)   Seated add ball squeeze LE 5sec x20  Seated UE add squeeze 5sec x20 Total time: 15 min  Nustep UE/LE L4 x 7min (feeling symptoms)  Standing SB push down 5sec x15  Seated add ball squeeze LE 5sec x20 Total time: 8 min  Standing SB push down 5sec x20  Seated add UE/LE ball squeeze LE 5sec x20 ea Total time: 20 min  Standing SB push down 5sec x20  Seated add UE/LE ball squeeze LE 5sec x20 ea  Nustep UE/LE L5 x 10 min Total time: 20 min  Standing SB push down 5sec x20  Seated add UE/LE ball squeeze LE 5sec x20 ea  RTB rows/ext   Nustep UE/LE L5 x 10 min   Neuro Seated VOR cancel with ball x15 ea   Time: 15 min  Seated VOR cancel with ball x15 ea  Airex march x20 B UE support (started to have symptoms)  Airex abd x20 B UE support (started to have symptoms Time: 10 min  Seated VOR cancel with ball x15 ea  Optokinetic stim x 3 min  Airex march x3 started to have symptoms   Time: 10 min  Seated VOR cancel with ball x15 ea  March x10   Time: 8 min  Seated VOR cancel with ball x20 ea  March x15     Manual STM to B traps x 10 min STM to B traps x 10 min STM to B traps x 10 min STM to B traps x 10 min STM to B traps x 10 min   gait        HEP: 12/29/22 optokinetic stim handout  11/9/22 seated grounding  11/2/22 VOR cancellation, pencil push ups (see handout)     Charges: 1 therex, 1 man, 1 neuro        Total Timed Treatment: 38 min  Total Treatment Time: 38 min

## 2023-01-10 NOTE — TELEPHONE ENCOUNTER
From: John Peres  To: Carmina Bateman MD  Sent: 1/10/2023 12:23 PM CST  Subject: New Physical Therapy Referral    Can you please provide a new referral for physical therapy as I have new insurance and now all [facility] facilities are tier 2.      Thank you,    Jurgen Wilson

## 2023-01-17 ENCOUNTER — OFFICE VISIT (OUTPATIENT)
Dept: PHYSICAL THERAPY | Age: 43
End: 2023-01-17
Attending: INTERNAL MEDICINE
Payer: COMMERCIAL

## 2023-01-17 ENCOUNTER — PATIENT MESSAGE (OUTPATIENT)
Dept: NEUROLOGY | Facility: CLINIC | Age: 43
End: 2023-01-17

## 2023-01-17 PROCEDURE — 97140 MANUAL THERAPY 1/> REGIONS: CPT | Performed by: PHYSICAL THERAPIST

## 2023-01-17 PROCEDURE — 97112 NEUROMUSCULAR REEDUCATION: CPT | Performed by: PHYSICAL THERAPIST

## 2023-01-17 PROCEDURE — 97110 THERAPEUTIC EXERCISES: CPT | Performed by: PHYSICAL THERAPIST

## 2023-01-17 NOTE — TELEPHONE ENCOUNTER
From: Denis Jasmine  To: Igor Arias MD  Sent: 1/17/2023 10:34 AM CST  Subject: New Physical Therapy Script    Can you please fax over my script for physical therapy with doctors signature to my new physical therapy location at [facility]. I can not start physical therapy with out a physical script and doctors signature.      The fax number is [phone]    Thank you,    Kenton Bowling

## 2023-01-17 NOTE — PROGRESS NOTES
Dx: Migraine with aura and without status migrainosus, not intractable (G43.109)  Vertigo (R42)         Insurance (Authorized # of Visits):  21 PPO           Authorizing Physician: Dr. Fabián Huerta MD visit: 11/2/22  Fall Risk: standard         Precautions: n/a         Evaluation Date: 10/27/22  Previous Level of Function: pt was able to driving and working without difficulties    Subjective: Pt reports that he went to vision therapy last week and they said they saw a lot of improvement. Pt reports that he still has difficulties with driving at night. Pt reports that he continues to get daily headaches. Pt reports no falls. Pain Headache  4/10; C-spine 2/10; Dizziness: none  Objective: Added diagonals        Assessment: increased VOR cancellation exercises. Goals:   Goals: To be met in 4-6 weeks   1. Pt. to be independent home exercise program, post treatment instructions to allow patients safe return to return to walking in community. 2.Pt. to be able to look under bed without c/o vertigo with 3/3 trials. 3.Pt. to improve static/dynamic balance, gait, functional activities with challenges of head turns to allow for community ambulation. 4.Pt to report 50% or more dec in symptoms while waking up in the morning. 5. Pt to report ability to drive without symptoms. Plan: Cont PT per plan of care; pt to look for another PT in network   Date: 1/17/2023  TX#: 12/20 Date:               TX#: 13/20 Date:                TX#: 14/20 Date:              TX#: 15/20 Date:    Tx#: 16/20   There ex Total time: 20 min  Standing SB push down 5sec x20  Seated add UE/LE ball squeeze LE 5sec x20 ea  RTB rows/ext  5 sec x20  Nustep UE/LE L5 x 5 min       Neuro Time: 10 min  Seated VOR cancel with ball hor/iglesia/diagonal x20ea  March x10      Time: 8 min  Seated VOR cancel with ball x20 ea  March x15     Manual STM to B traps x 10 min STM to B traps x 10 min STM to B traps x 10 min STM to B traps x 10 min STM to B traps x 10 min   gait        HEP: 12/29/22 optokinetic stim handout  11/9/22 seated grounding  11/2/22 VOR cancellation, pencil push ups (see handout)      Charges: 1 therex, 1 man, 1 neuro        Total Timed Treatment: 38 min  Total Treatment Time: 38 min

## 2023-01-18 DIAGNOSIS — R42 VERTIGO: ICD-10-CM

## 2023-01-18 DIAGNOSIS — H53.2 DIPLOPIA: ICD-10-CM

## 2023-01-18 DIAGNOSIS — G43.109 MIGRAINE WITH AURA AND WITHOUT STATUS MIGRAINOSUS, NOT INTRACTABLE: Primary | ICD-10-CM

## 2023-01-23 ENCOUNTER — TELEPHONE (OUTPATIENT)
Dept: PHYSICAL MEDICINE AND REHAB | Facility: CLINIC | Age: 43
End: 2023-01-23

## 2023-01-23 NOTE — TELEPHONE ENCOUNTER
Report received from [facility]. Report has been placed on the physicians desk to review. Report has also been sent for scanning. Reviewed and electronically signed by:  [address] [facility], [address] [place] [address], [facility]

## 2023-01-24 ENCOUNTER — APPOINTMENT (OUTPATIENT)
Dept: PHYSICAL THERAPY | Age: 43
End: 2023-01-24
Attending: INTERNAL MEDICINE
Payer: COMMERCIAL

## 2023-01-24 ENCOUNTER — OFFICE VISIT (OUTPATIENT)
Dept: NEUROLOGY | Facility: CLINIC | Age: 43
End: 2023-01-24
Payer: COMMERCIAL

## 2023-01-24 VITALS — HEART RATE: 81 BPM | OXYGEN SATURATION: 98 % | SYSTOLIC BLOOD PRESSURE: 100 MMHG | DIASTOLIC BLOOD PRESSURE: 64 MMHG

## 2023-01-24 DIAGNOSIS — R29.818 TRANSIENT NEUROLOGICAL SYMPTOMS: ICD-10-CM

## 2023-01-24 DIAGNOSIS — R42 VERTIGO: ICD-10-CM

## 2023-01-24 DIAGNOSIS — G43.109 MIGRAINE WITH AURA AND WITHOUT STATUS MIGRAINOSUS, NOT INTRACTABLE: Primary | ICD-10-CM

## 2023-01-24 PROCEDURE — 99214 OFFICE O/P EST MOD 30 MIN: CPT | Performed by: OTHER

## 2023-01-24 PROCEDURE — 3078F DIAST BP <80 MM HG: CPT | Performed by: OTHER

## 2023-01-24 PROCEDURE — 3074F SYST BP LT 130 MM HG: CPT | Performed by: OTHER

## 2023-01-24 RX ORDER — PROPRANOLOL HYDROCHLORIDE 20 MG/1
TABLET ORAL
Qty: 120 TABLET | Refills: 1 | Status: SHIPPED | OUTPATIENT
Start: 2023-01-24

## 2023-01-31 ENCOUNTER — OFFICE VISIT (OUTPATIENT)
Dept: PHYSICAL THERAPY | Age: 43
End: 2023-01-31
Attending: INTERNAL MEDICINE
Payer: COMMERCIAL

## 2023-01-31 PROCEDURE — 97112 NEUROMUSCULAR REEDUCATION: CPT | Performed by: PHYSICAL THERAPIST

## 2023-01-31 PROCEDURE — 97110 THERAPEUTIC EXERCISES: CPT | Performed by: PHYSICAL THERAPIST

## 2023-01-31 NOTE — PROGRESS NOTES
Michael  Pt has attended 13 visits in Physical Therapy. Reporting period: 10/27/22 to 1/31/2023      Dx: Migraine with aura and without status migrainosus, not intractable (G43.109)  Vertigo (R42)         Insurance (Authorized # of Visits):  21 PPO           Authorizing Physician: Dr. Maira Huerta MD visit: 11/2/22  Fall Risk: standard         Precautions: n/a         Evaluation Date: 10/27/22  Previous Level of Function: pt was able to driving and working without difficulties    Subjective: Pt reports that he saw the MD and the MD recommended him to apply for disability. Pt reports that the MD changed his meds a little bit. Pt is going to start PT at the new location in mid February. Pt reports that he is still going to vision therapy. Pt reports that they are going to expose him to lights for a prolonged period of time. Pt reports that he is 20% better since beginning PT. Pt reports that he feels like he can do more physical activity, watch the videos longer, less migraines and can be in the car longer. Pt reports that he no longer has neck pain. Pain Headache  3/10; C-spine 0/10; Dizziness: none  Objective:  Physical Exam:  Posture/Observation: poor B rounded shoulders and forward head, slouched sitting                       Cervical spine ROM: [id number]%, Ext 100%, R %, L % , R %, L %         Oculomotor/Vestibular Exam:  Spontaneous Nystagmus:  In light neg In darkness neg  Smooth Pursuit: Negative   Convergence: Positive 6 inches   Head Thrust: Negative  Gaze Evoked Nystagmus: Negative  Head Shaking Nystagmus: Negative         Postural Control:   Romberg: EO 20 sec, EC 3 sec towards the R    Romberg on Foam: EO 10 sec mod sway, EC 6 sec   Tandem Stance: R back EO 10 sec L back EO 10 sec   SLS: R EO 7 sec, L EO 10 sec    Functional Mobility:  Functional Mobility/Gait:slow gait with dec B UE swing    DHI 54/100        Assessment: Pt continues to have symptoms of dizziness and headaches. Pt continues to demo a poor DHI score, dec balance and dizziness. Pt is to be discharged from [place] [address] and will continue vestibular therapy at another location that accepts his insurance. Goals:   Goals: To be met in 4-6 weeks   1. Pt. to be independent home exercise program, post treatment instructions to allow patients safe return to return to walking in community. -MET  2. Pt. to be able to look under bed without c/o vertigo with 3/3 trials.-not assessed  3. Pt. to improve static/dynamic balance, gait, functional activities with challenges of head turns to allow for community ambulation.-ongoing  4. Pt to report 50% or more dec in symptoms while waking up in the morning.-ongoing  5. Pt to report ability to drive without symptoms. -ongoing    Plan: Discharge from PT at [facility], will resume at another location due to insurance change    Patient was advised of these findings, precautions, and treatment options and has agreed to actively participate in planning and for this course of care. Thank you for your referral. If you have any questions, please contact me at Dept: [phone]. Sincerely,  Electronically signed by therapist Ata Chapa PT, DPT, cert MDT    [de-identified] certification required:  No             Date: 1/17/2023  TX#: 12/20 Date: 1/31/23              TX#: 13/20 Date:                TX#: 14/20 Date:              TX#: 15/20 Date:    Tx#: 16/20   There ex Total time: 20 min  Standing SB push down 5sec x20  Seated add UE/LE ball squeeze LE 5sec x20 ea  RTB rows/ext  5 sec x20  Nustep UE/LE L5 x 5 min Total time: 20 min  Standing SB push down 5sec x20  Seated add UE/LE ball squeeze LE 5sec x20 ea  RTB rows/ext  5 sec x20  Nustep UE/LE L5 x 5 min      Neuro Time: 10 min  Seated VOR cancel with ball hor/iglesia/diagonal x20ea  March x10   Time: 10 min  Seated VOR cancel with ball hor/iglesia/diagonal x20ea  March x10   Time: 8 min  Seated VOR cancel with ball x20 ea  March x15 Manual STM to B traps x 10 min  STM to B traps x 10 min STM to B traps x 10 min STM to B traps x 10 min   gait        HEP: 12/29/22 optokinetic stim handout  11/9/22 seated grounding  11/2/22 VOR cancellation, pencil push ups (see handout)      Charges: 2 therex,  1 neuro        Total Timed Treatment: 38 min  Total Treatment Time: 38 min

## 2023-02-10 ENCOUNTER — APPOINTMENT (OUTPATIENT)
Dept: NEUROLOGY | Age: 43
End: 2023-02-10
Attending: PSYCHIATRY & NEUROLOGY

## 2023-02-16 ENCOUNTER — TELEPHONE (OUTPATIENT)
Dept: NEUROLOGY | Facility: CLINIC | Age: 43
End: 2023-02-16

## 2023-02-16 ENCOUNTER — HOSPITAL ENCOUNTER (OUTPATIENT)
Dept: PHYSICAL MEDICINE AND REHAB | Age: 43
Discharge: STILL ADMITTED OR ADMINISTRATIVE | End: 2023-02-16
Attending: PSYCHIATRY & NEUROLOGY

## 2023-02-16 ENCOUNTER — HOSPITAL ENCOUNTER (OUTPATIENT)
Dept: NEUROLOGY | Age: 43
End: 2023-02-16
Attending: PSYCHIATRY & NEUROLOGY

## 2023-02-16 PROCEDURE — 97110 THERAPEUTIC EXERCISES: CPT | Performed by: PHYSICAL THERAPIST

## 2023-02-16 PROCEDURE — 97162 PT EVAL MOD COMPLEX 30 MIN: CPT | Performed by: PHYSICAL THERAPIST

## 2023-02-16 ASSESSMENT — ENCOUNTER SYMPTOMS
ALLEVIATING FACTORS: REST
QUALITY: SHOOTING
PAIN SEVERITY NOW: 6
QUALITY: SHARP
PAIN LOCATION: HEADACHES
QUALITY: ACHE
ALLEVIATING FACTORS: PRESCRIPTION MEDICATIONS
PAIN FREQUENCY: CONSTANT
PAIN SCALE AT HIGHEST: 10
QUALITY: THROBBING

## 2023-02-17 ENCOUNTER — HOSPITAL ENCOUNTER (OUTPATIENT)
Dept: NEUROLOGY | Age: 43
Discharge: HOME | End: 2023-02-17
Attending: PSYCHIATRY & NEUROLOGY

## 2023-02-17 DIAGNOSIS — R29.818 HEADACHE WITH TRANSIENT NEUROLOGIC DEFICITS AND CSF LYMPHOCYTOSIS: ICD-10-CM

## 2023-02-17 DIAGNOSIS — D72.820 HEADACHE WITH TRANSIENT NEUROLOGIC DEFICITS AND CSF LYMPHOCYTOSIS: ICD-10-CM

## 2023-02-17 DIAGNOSIS — R51.9 HEADACHE WITH TRANSIENT NEUROLOGIC DEFICITS AND CSF LYMPHOCYTOSIS: ICD-10-CM

## 2023-02-17 PROCEDURE — 95819 EEG AWAKE AND ASLEEP: CPT

## 2023-02-20 ENCOUNTER — TELEPHONE (OUTPATIENT)
Dept: NEUROLOGY | Facility: CLINIC | Age: 43
End: 2023-02-20

## 2023-02-20 NOTE — TELEPHONE ENCOUNTER
Received EEG report from [address] [place] [address]. Report has been placed on Dr. Mandy Russell desk to review and copy has been sent for scanning. Reviewed and electronically signed by:  500 [facility], [address] [place] [address], [facility]

## 2023-02-23 ENCOUNTER — OFFICE VISIT (OUTPATIENT)
Dept: NEUROLOGY | Facility: CLINIC | Age: 43
End: 2023-02-23
Payer: COMMERCIAL

## 2023-02-23 ENCOUNTER — HOSPITAL ENCOUNTER (OUTPATIENT)
Dept: PHYSICAL MEDICINE AND REHAB | Age: 43
Discharge: STILL ADMITTED OR ADMINISTRATIVE | End: 2023-02-23
Attending: PSYCHIATRY & NEUROLOGY

## 2023-02-23 VITALS
HEIGHT: 70 IN | SYSTOLIC BLOOD PRESSURE: 104 MMHG | HEART RATE: 73 BPM | WEIGHT: 234 LBS | DIASTOLIC BLOOD PRESSURE: 60 MMHG | BODY MASS INDEX: 33.5 KG/M2

## 2023-02-23 DIAGNOSIS — R29.818 TRANSIENT NEUROLOGICAL SYMPTOMS: ICD-10-CM

## 2023-02-23 DIAGNOSIS — R42 VERTIGO: ICD-10-CM

## 2023-02-23 DIAGNOSIS — G43.109 MIGRAINE WITH AURA AND WITHOUT STATUS MIGRAINOSUS, NOT INTRACTABLE: Primary | ICD-10-CM

## 2023-02-23 DIAGNOSIS — G43.109 CHRONIC MIGRAINE WITH AURA: ICD-10-CM

## 2023-02-23 PROCEDURE — 3008F BODY MASS INDEX DOCD: CPT | Performed by: OTHER

## 2023-02-23 PROCEDURE — 99214 OFFICE O/P EST MOD 30 MIN: CPT | Performed by: OTHER

## 2023-02-23 PROCEDURE — 3078F DIAST BP <80 MM HG: CPT | Performed by: OTHER

## 2023-02-23 PROCEDURE — 3074F SYST BP LT 130 MM HG: CPT | Performed by: OTHER

## 2023-02-23 PROCEDURE — 97140 MANUAL THERAPY 1/> REGIONS: CPT | Performed by: PHYSICAL THERAPIST

## 2023-02-23 ASSESSMENT — ENCOUNTER SYMPTOMS: PAIN SEVERITY NOW: 6

## 2023-02-27 DIAGNOSIS — Z51.81 ANTICOAGULATION MANAGEMENT ENCOUNTER: ICD-10-CM

## 2023-02-27 DIAGNOSIS — I82.411 ACUTE DEEP VEIN THROMBOSIS (DVT) OF FEMORAL VEIN OF RIGHT LOWER EXTREMITY (HCC): Primary | ICD-10-CM

## 2023-02-27 DIAGNOSIS — Z79.01 ANTICOAGULATION MANAGEMENT ENCOUNTER: ICD-10-CM

## 2023-03-02 ENCOUNTER — HOSPITAL ENCOUNTER (OUTPATIENT)
Dept: PHYSICAL MEDICINE AND REHAB | Age: 43
Discharge: STILL ADMITTED OR ADMINISTRATIVE | End: 2023-03-02
Attending: PSYCHIATRY & NEUROLOGY

## 2023-03-02 PROCEDURE — 97110 THERAPEUTIC EXERCISES: CPT | Performed by: PHYSICAL THERAPIST

## 2023-03-02 PROCEDURE — 97140 MANUAL THERAPY 1/> REGIONS: CPT | Performed by: PHYSICAL THERAPIST

## 2023-03-09 ENCOUNTER — HOSPITAL ENCOUNTER (OUTPATIENT)
Dept: PHYSICAL MEDICINE AND REHAB | Age: 43
Discharge: STILL ADMITTED OR ADMINISTRATIVE | End: 2023-03-09
Attending: PSYCHIATRY & NEUROLOGY

## 2023-03-09 PROCEDURE — 97140 MANUAL THERAPY 1/> REGIONS: CPT | Performed by: PHYSICAL THERAPIST

## 2023-03-09 PROCEDURE — 97110 THERAPEUTIC EXERCISES: CPT | Performed by: PHYSICAL THERAPIST

## 2023-03-13 NOTE — TELEPHONE ENCOUNTER
The patient is requesting a refill on:   Atogepant 60 MG Oral Tab          Sig: Take 60 mg by mouth daily.          Last OV: 2/23/3  Next OV: 6/16/23  Last refilled: 10/5/22 #30 with 3 refills

## 2023-03-14 RX ORDER — ATOGEPANT 60 MG/1
TABLET ORAL
Qty: 30 TABLET | Refills: 3 | OUTPATIENT
Start: 2023-03-14

## 2023-03-14 NOTE — TELEPHONE ENCOUNTER
Duplicate request.  Reviewed and electronically signed by:  500 [facility], [address] [place] [address], [facility]

## 2023-03-16 ENCOUNTER — HOSPITAL ENCOUNTER (OUTPATIENT)
Dept: PHYSICAL MEDICINE AND REHAB | Age: 43
Discharge: STILL ADMITTED OR ADMINISTRATIVE | End: 2023-03-16
Attending: PSYCHIATRY & NEUROLOGY

## 2023-03-16 PROCEDURE — 97140 MANUAL THERAPY 1/> REGIONS: CPT | Performed by: PHYSICAL THERAPIST

## 2023-03-16 PROCEDURE — 97110 THERAPEUTIC EXERCISES: CPT | Performed by: PHYSICAL THERAPIST

## 2023-03-23 ENCOUNTER — HOSPITAL ENCOUNTER (OUTPATIENT)
Dept: PHYSICAL MEDICINE AND REHAB | Age: 43
Discharge: STILL ADMITTED OR ADMINISTRATIVE | End: 2023-03-23
Attending: PSYCHIATRY & NEUROLOGY

## 2023-03-23 ENCOUNTER — TELEPHONE (OUTPATIENT)
Dept: PHYSICAL MEDICINE AND REHAB | Facility: CLINIC | Age: 43
End: 2023-03-23

## 2023-03-23 ENCOUNTER — PATIENT MESSAGE (OUTPATIENT)
Dept: NEUROLOGY | Facility: CLINIC | Age: 43
End: 2023-03-23

## 2023-03-23 PROCEDURE — 97140 MANUAL THERAPY 1/> REGIONS: CPT | Performed by: PHYSICAL THERAPIST

## 2023-03-23 NOTE — TELEPHONE ENCOUNTER
Per Brodie Jeronimo at [facility], patient has [employer] plan and can go outside of [facility], Covered at Tier 2 rate. Case #[id number].  Corinne Person faxed Botox Prior Authorization form for completion    Completed [address] JIMENEZ Request form to initiate authorization for Botox 200 units CPT [id number], [address] dx:G43.109   Completed form and clinicals faxed to [phone]  Status: pending

## 2023-03-23 NOTE — TELEPHONE ENCOUNTER
From: Linda Khan  To: Desean Hernadez MD  Sent: 3/23/2023 2:08 PM CDT  Subject: Medical Marijuana Card    I am wondering if Dr. Ru Lindsey could approve me for a medical marijuana card to help with my migraines.      Thank you,  Joseline Vinson

## 2023-03-23 NOTE — TELEPHONE ENCOUNTER
Please advise. Reviewed and electronically signed by:  500 [facility], [address] [place] [address], [facility]

## 2023-03-28 NOTE — TELEPHONE ENCOUNTER
Spoke to Shyam at [facility] who states Botox is still pending Case #[id number].   Form and clinicals were received per Shyam  Turnaround time for determination is 10 business days

## 2023-03-30 ENCOUNTER — APPOINTMENT (OUTPATIENT)
Dept: PHYSICAL MEDICINE AND REHAB | Age: 43
End: 2023-03-30
Attending: PSYCHIATRY & NEUROLOGY

## 2023-04-10 ENCOUNTER — TELEPHONE (OUTPATIENT)
Dept: TELEHEALTH | Age: 43
End: 2023-04-10

## 2023-04-10 NOTE — TELEPHONE ENCOUNTER
Contacted James REEVES at [facility] [employer] who states Botox is still pending  No determination has been made yet-James sent message expediting determination

## 2023-04-11 NOTE — TELEPHONE ENCOUNTER
Received Approved referral from [address] for Botox 4 units valid 4/6/2023-4/5/2024 w/ referral #[id number] BUY[employer]BILL    Insurance: [address] Yifan Marie Member ID# [id number]  Medication: Botox 200 units  Number of visits Approved: 4 -next visit will be 1 of 4  Dx: J11.356   Last Botox done: n/a  Next Botox due around: now- New Start  Authorization/Referral #[id number]  Valid 4/6/23-4/5/2024  BUY[employer]BILL Cartilage Graft Text: The defect edges were debeveled with a #15c scalpel blade.  Given the location of the defect, shape of the defect, the fact the defect involved a full thickness cartilage defect a cartilage graft was deemed most appropriate.  An appropriate donor site was identified, cleansed, and anesthetized. The cartilage graft was then harvested and transferred to the recipient site, oriented appropriately and then sutured into place.  The secondary defect was then repaired using a primary closure.

## 2023-04-26 ENCOUNTER — PATIENT MESSAGE (OUTPATIENT)
Dept: NEUROLOGY | Facility: CLINIC | Age: 43
End: 2023-04-26

## 2023-04-27 NOTE — TELEPHONE ENCOUNTER
From: Kaushik Moore  To: Janel Avina MD  Sent: 4/26/2023 5:40 PM CDT  Subject: [place] Hernandez Prescription     Can you please send over a prescription for my Edwin Hernandez to my new mail order pharmacy?     [facility] # - [phone]    Fax Number - [phone]    Thank you,    [facility]

## 2023-04-28 ENCOUNTER — OFFICE VISIT (OUTPATIENT)
Dept: NEUROLOGY | Facility: CLINIC | Age: 43
End: 2023-04-28
Payer: COMMERCIAL

## 2023-04-28 DIAGNOSIS — G43.109 CHRONIC MIGRAINE WITH AURA: Primary | ICD-10-CM

## 2023-04-28 PROCEDURE — 64615 CHEMODENERV MUSC MIGRAINE: CPT | Performed by: OTHER

## 2023-04-28 NOTE — PROCEDURES
PROCEDURE: Botox Injections (MIGRAINE PROTOCOL)   PRE-OPERATIVE DIAGNOSIS: Chronic Migraine  POST-OPERATIVE DIAGNOSIS: same as above   BLOOD LOSS: minimal COMPLICATIONS: none     DESCRIPTION OF PROCEDURE: After a discussion of the risks and benefits, the patient consented to the procedure. The patient was taken to the procedure room. The upper back, neck, and occipital area was prepped with alcohol swabs. The 2 Botox vials containing 100 units each, were reconstituted with saline. There are 31 distinct targets in the standard protocol. Following muscles and units were injected:     10 units divided between 2 sites. Procerus 5 units in 1 site. Frontalis 20 units divided between 4 sites. Temporalis 40 units divided between 8 sites. Occipitalis 30 units divided between 6 sites. Cervical paraspinal 20 units divided between 4 sites. Trapezius 30 units divided between 6 sites. Patient tolerated the procedure well. Total of 155 Units of botulinum toxin were used and 45 Units were wasted.

## 2023-04-28 NOTE — PROGRESS NOTES
Paperwork noting that patient may bear financial responsibility for procedure(s) performed in clinic today signed prior to proceeding with procedure(s). Furthermore, patient notified that they should contact their insurer to verify that your procedure/test has been approved and is a COVERED benefit. Although the [facility] staff does its due diligence, the insurance carrier gives the disclaimer that \"Although the procedure is authorized, this does not guarantee payment. \"    Ultimately the patient is responsible for payment. Botox is:  [x] Buy and Bill  [] Patient Supplied      New pt to Botox.

## 2023-05-17 DIAGNOSIS — N50.811 PAIN IN RIGHT TESTICLE: Primary | ICD-10-CM

## 2023-05-19 ENCOUNTER — HOSPITAL ENCOUNTER (OUTPATIENT)
Dept: ULTRASOUND IMAGING | Age: 43
Discharge: HOME | End: 2023-05-19
Attending: INTERNAL MEDICINE

## 2023-05-19 DIAGNOSIS — N50.811 PAIN IN RIGHT TESTICLE: ICD-10-CM

## 2023-05-19 PROCEDURE — 93975 VASCULAR STUDY: CPT

## 2023-06-21 ENCOUNTER — TELEPHONE (OUTPATIENT)
Dept: NEUROLOGY | Facility: CLINIC | Age: 43
End: 2023-06-21

## 2023-07-31 ENCOUNTER — PATIENT MESSAGE (OUTPATIENT)
Dept: NEUROLOGY | Facility: CLINIC | Age: 43
End: 2023-07-31

## 2023-07-31 NOTE — TELEPHONE ENCOUNTER
From: Ann Shoulders  To: Cleavon Dubin, MD  Sent: 7/31/2023 8:27 AM CDT  Subject: Attending Physician Medical Report    A few weeks ago I dropped off a medical form to be filled out by Dr. Ryan Duran for my disability company. Can you please fax that form to the number at the bottom left side of the page at your earliest opportunity.      Fax number [phone]    Thank you,  Davin Encinas

## 2023-08-01 ENCOUNTER — OFFICE VISIT (OUTPATIENT)
Dept: NEUROLOGY | Facility: CLINIC | Age: 43
End: 2023-08-01
Payer: COMMERCIAL

## 2023-08-01 ENCOUNTER — TELEPHONE (OUTPATIENT)
Dept: NEUROLOGY | Facility: CLINIC | Age: 43
End: 2023-08-01

## 2023-08-01 DIAGNOSIS — G43.819 OTHER MIGRAINE WITHOUT STATUS MIGRAINOSUS, INTRACTABLE: Primary | ICD-10-CM

## 2023-08-01 DIAGNOSIS — G43.109 CHRONIC MIGRAINE WITH AURA: Primary | ICD-10-CM

## 2023-08-01 PROCEDURE — 64615 CHEMODENERV MUSC MIGRAINE: CPT | Performed by: OTHER

## 2023-08-01 RX ORDER — TOPIRAMATE 25 MG/1
TABLET ORAL
Qty: 150 TABLET | Refills: 3 | Status: SHIPPED | OUTPATIENT
Start: 2023-08-01

## 2023-08-01 NOTE — TELEPHONE ENCOUNTER
Paperwork noting that patient may bear financial responsibility for procedure(s) performed in clinic today signed prior to proceeding with procedure(s). Furthermore, patient notified that they should contact their insurer to verify that your procedure/test has been approved and is a COVERED benefit. Although the [facility] staff does its due diligence, the insurance carrier gives the disclaimer that \"Although the procedure is authorized, this does not guarantee payment. \"    Ultimately the patient is responsible for payment. Botox is:  [x] Buy and Bill  [] Patient Supplied      Botox Reauthorization Questions:  Has the patient experienced a reduction in frequency of migraines since starting Botox? yes  If yes, by what percentage? 75%  Has the intensity of migraines decreased since starting Botox?  yes  If yes, by what percentage? 75%

## 2023-08-17 ENCOUNTER — APPOINTMENT (OUTPATIENT)
Dept: HEMATOLOGY/ONCOLOGY | Facility: HOSPITAL | Age: 43
End: 2023-08-17
Attending: STUDENT IN AN ORGANIZED HEALTH CARE EDUCATION/TRAINING PROGRAM
Payer: COMMERCIAL

## 2023-08-17 DIAGNOSIS — G43.109 CHRONIC MIGRAINE WITH AURA: Primary | ICD-10-CM

## 2023-08-17 RX ORDER — ATOGEPANT 60 MG/1
1 TABLET ORAL DAILY
Qty: 30 TABLET | Refills: 1 | Status: SHIPPED | OUTPATIENT
Start: 2023-08-17

## 2023-08-17 NOTE — TELEPHONE ENCOUNTER
Requested Prescriptions     Pending Prescriptions Disp Refills    QULIPTA 60 MG Oral Tab [Pharmacy Med Name: Amalia Agustin 60 MG Oral Tablet (Atogepant)] 30 tablet 1     Sig: Take 1 tablet by mouth daily.         LOV: 8/1/23  NOV: 11/7/23    Last refill/[place]: 4/27/23

## 2023-08-18 ENCOUNTER — TELEPHONE (OUTPATIENT)
Dept: NEUROLOGY | Facility: CLINIC | Age: 43
End: 2023-08-18

## 2023-08-21 ENCOUNTER — OFFICE VISIT (OUTPATIENT)
Dept: HEMATOLOGY/ONCOLOGY | Facility: HOSPITAL | Age: 43
End: 2023-08-21
Attending: STUDENT IN AN ORGANIZED HEALTH CARE EDUCATION/TRAINING PROGRAM
Payer: COMMERCIAL

## 2023-08-21 VITALS
SYSTOLIC BLOOD PRESSURE: 108 MMHG | WEIGHT: 238.63 LBS | TEMPERATURE: 98 F | DIASTOLIC BLOOD PRESSURE: 67 MMHG | HEIGHT: 70 IN | RESPIRATION RATE: 18 BRPM | OXYGEN SATURATION: 99 % | BODY MASS INDEX: 34.16 KG/M2 | HEART RATE: 91 BPM

## 2023-08-21 DIAGNOSIS — I82.411 ACUTE DEEP VEIN THROMBOSIS (DVT) OF FEMORAL VEIN OF RIGHT LOWER EXTREMITY (HCC): Primary | ICD-10-CM

## 2023-08-21 DIAGNOSIS — Z51.81 ANTICOAGULATION MANAGEMENT ENCOUNTER: ICD-10-CM

## 2023-08-21 DIAGNOSIS — Z79.01 ANTICOAGULATION MANAGEMENT ENCOUNTER: ICD-10-CM

## 2023-08-21 PROCEDURE — 99214 OFFICE O/P EST MOD 30 MIN: CPT | Performed by: STUDENT IN AN ORGANIZED HEALTH CARE EDUCATION/TRAINING PROGRAM

## 2023-09-19 ENCOUNTER — PATIENT MESSAGE (OUTPATIENT)
Dept: NEUROLOGY | Facility: CLINIC | Age: 43
End: 2023-09-19

## 2023-09-19 DIAGNOSIS — G43.109 CHRONIC MIGRAINE WITH AURA: Primary | ICD-10-CM

## 2023-09-19 NOTE — TELEPHONE ENCOUNTER
From: Aurora Florez  To: Patricia Zamora  Sent: 9/19/2023 12:45 PM CDT  Subject: TopaMax issues    I am wondering if it ok if I come off of the TopaMax prescription? I am having a lot of cognitive issues since re-starting prescription. I am having constant memory loss, foggy brain, slower reaction times, and slower mental capacity. If it is ok to come off, is there another prescription that I can go in place?     Thank you,  Jesse Khalil

## 2023-09-19 NOTE — TELEPHONE ENCOUNTER
Dr. Russell Gather, please review and advise. Reviewed and electronically signed by:  500 [facility], [address] [place] [address], [facility]

## 2023-09-19 NOTE — TELEPHONE ENCOUNTER
Lets lower topiramate to 2 pills at night for 1 mg 1 pill at night for another week and then stop. Does he have a preference for a different preventive treatment?   He has tried quite a few things in the past.

## 2023-09-20 ENCOUNTER — TELEPHONE (OUTPATIENT)
Dept: OTHER | Age: 43
End: 2023-09-20

## 2023-09-20 RX ORDER — FREMANEZUMAB-VFRM 225 MG/1.5ML
225 INJECTION SUBCUTANEOUS
Qty: 1 EACH | Refills: 5 | Status: SHIPPED | OUTPATIENT
Start: 2023-09-20

## 2023-09-27 ENCOUNTER — APPOINTMENT (OUTPATIENT)
Dept: OTOLARYNGOLOGY | Age: 43
End: 2023-09-27

## 2023-10-02 ENCOUNTER — PATIENT MESSAGE (OUTPATIENT)
Dept: NEUROLOGY | Facility: CLINIC | Age: 43
End: 2023-10-02

## 2023-10-02 DIAGNOSIS — G43.E11 INTRACTABLE CHRONIC MIGRAINE WITH AURA WITH STATUS MIGRAINOSUS: Primary | ICD-10-CM

## 2023-10-02 NOTE — TELEPHONE ENCOUNTER
From: Linda Khan  To: Desean Hernadez  Sent: 10/2/2023 1:14 PM CDT  Subject: MRI with Contrast of the vessels    Can I please have the doctor order an MRI with contrast of the vessels of the brain and eyes. I would not to make sure there are no blockages or mini clots. I continue to have severe headaches and no my eyes are beginning to hurt more often with sever pain and sight issues. If the doctor agrees, I would need the physical order so [facility].      Thank you,  Odalys Goldberg

## 2023-10-02 NOTE — TELEPHONE ENCOUNTER
Attempted to completed PA though CM. A previously denied or partially denied PA request has been located for this member. To initiate an appeal or reconsideration please call [phone]. Thank you.

## 2023-10-02 NOTE — TELEPHONE ENCOUNTER
Per Epic review,   MRV Head completed 12/26/22-no significant abnormalities. Non contrast MRI Brain completed 10/3/22-normal.  No MRA noted    LOV 2/23/23 (non-botox)    Assessment  1. Migraine with aura and without status migrainosus, not intractable  Patient with history of migraine for few years,   However unclear if everything he describes was a typical classic migraine. Therefore EEG and MRI also was done and that was not revealing     He will continue with Inetta Nageotte and Nurtec as needed use. At this point possibly developing chronic migraines, and propranolol was not helpful, therefore it will be stopped, continue with topiramate. At this point we will apply for Botox. Patient may benefit from a tertiary center evaluation in the future     MRV was not revealing. In the meantime topiramate will be continued. Continue to work with vestibular therapist.    -Per TE on 9/19/23, topamax causing cognitive issues, pt advised to discontinue.  ________________________    Attempted to call patient. No answer, then fast busy. Unable to leave message. My chart message sent in attempt to obtain more clinical information. Advised ER for persistent, severe, migraines with sight issues as soon as possible for further evaluation.

## 2023-10-02 NOTE — TELEPHONE ENCOUNTER
From: Denis Jasmine  To: Igor Arias  Sent: 10/2/2023 12:58 PM CDT  Subject: Amedeo Blair Pre-authorization    Can you please contact my insurance company @   [phone] to provide a pee-authorization for my Ajovy prescription. The prescription has been denied because there was not a pre-authorization.      Thank you,    Kenton Bowling

## 2023-10-03 NOTE — TELEPHONE ENCOUNTER
I spoke with the patients insurance, and the patient needs to try and fail the formulary injectable medication - either Aimovig or Emgality. I the physician does not which to change to formulary, a peer to peer is needed ([phone]). Dr. Farr Reynaldo, please advise on how to proceed. Reviewed and electronically signed by:  [address], [facility]

## 2023-10-04 RX ORDER — GALCANEZUMAB 120 MG/ML
120 INJECTION, SOLUTION SUBCUTANEOUS
Qty: 1 EACH | Refills: 5 | Status: SHIPPED | OUTPATIENT
Start: 2023-10-04 | End: 2024-10-03

## 2023-10-05 NOTE — TELEPHONE ENCOUNTER
PA completed via allyve for [facility]. Will await determination. Reviewed and electronically signed by:  500 [facility], [address] [place] [address], [facility]

## 2023-10-12 DIAGNOSIS — G43.E09 CHRONIC MIGRAINE WITH AURA: ICD-10-CM

## 2023-10-12 RX ORDER — ATOGEPANT 60 MG/1
1 TABLET ORAL DAILY
Qty: 30 TABLET | Refills: 1 | Status: SHIPPED | OUTPATIENT
Start: 2023-10-12

## 2023-10-12 NOTE — TELEPHONE ENCOUNTER
Requested Prescriptions     Pending Prescriptions Disp Refills    QULIPTA 60 MG Oral Tab [Pharmacy Med Name: Leti Liu 60 MG Oral Tablet (Atogepant)] 30 tablet 1     Sig: Take 1 tablet by mouth daily.      LOV: 2/23/23  NOV: 11/7/23 (BOTOX)    Last refill/[place]: 8/17/23

## 2023-10-25 ENCOUNTER — APPOINTMENT (OUTPATIENT)
Dept: OTOLARYNGOLOGY | Age: 43
End: 2023-10-25

## 2023-11-07 ENCOUNTER — OFFICE VISIT (OUTPATIENT)
Dept: NEUROLOGY | Facility: CLINIC | Age: 43
End: 2023-11-07
Payer: COMMERCIAL

## 2023-11-07 DIAGNOSIS — G43.819 OTHER MIGRAINE WITHOUT STATUS MIGRAINOSUS, INTRACTABLE: Primary | ICD-10-CM

## 2023-11-07 PROCEDURE — 64615 CHEMODENERV MUSC MIGRAINE: CPT | Performed by: OTHER

## 2023-11-07 NOTE — PROGRESS NOTES
Paperwork noting that patient may bear financial responsibility for procedure(s) performed in clinic today signed prior to proceeding with procedure(s). Furthermore, patient notified that they should contact their insurer to verify that your procedure/test has been approved and is a COVERED benefit. Although the [facility] staff does its due diligence, the insurance carrier gives the disclaimer that \"Although the procedure is authorized, this does not guarantee payment. \"    Ultimately the patient is responsible for payment. Botox is:  [x] Buy and Bill  [] Patient Supplied      Botox Reauthorization Questions:  Has the patient experienced a reduction in frequency of migraines since starting Botox? no  If yes, by what percentage? 10%  Has the intensity of migraines decreased since starting Botox? yes  If yes, by what percentage?  50%

## 2023-11-09 RX ORDER — GALCANEZUMAB 120 MG/ML
120 INJECTION, SOLUTION SUBCUTANEOUS
Qty: 1 EACH | Refills: 5 | OUTPATIENT
Start: 2023-11-09 | End: 2024-11-08

## 2023-11-20 RX ORDER — LORAZEPAM 1 MG/1
1 TABLET ORAL EVERY 6 HOURS PRN
COMMUNITY
Start: 2023-09-19

## 2023-11-20 RX ORDER — CLOTRIMAZOLE AND BETAMETHASONE DIPROPIONATE 10; .64 MG/G; MG/G
1 CREAM TOPICAL PRN
COMMUNITY
Start: 2023-05-17

## 2023-11-20 RX ORDER — LEVOTHYROXINE, LIOTHYRONINE 19; 4.5 UG/1; UG/1
60 TABLET ORAL DAILY
COMMUNITY
Start: 2023-09-19

## 2023-11-20 RX ORDER — HYDROCODONE BITARTRATE AND ACETAMINOPHEN 10; 325 MG/1; MG/1
1 TABLET ORAL 3 TIMES DAILY PRN
COMMUNITY
Start: 2023-05-17

## 2023-11-20 RX ORDER — GALCANEZUMAB 120 MG/ML
120 INJECTION, SOLUTION SUBCUTANEOUS
COMMUNITY

## 2023-11-22 ENCOUNTER — ANESTHESIA (OUTPATIENT)
Dept: ADMINISTRATIVE | Age: 43
End: 2023-11-22

## 2023-11-22 ENCOUNTER — HOSPITAL ENCOUNTER (OUTPATIENT)
Dept: ADMINISTRATIVE | Age: 43
Discharge: HOME | End: 2023-11-22
Attending: INTERNAL MEDICINE

## 2023-11-22 ENCOUNTER — ANESTHESIA EVENT (OUTPATIENT)
Dept: ADMINISTRATIVE | Age: 43
End: 2023-11-22

## 2023-11-22 DIAGNOSIS — K52.89 OTHER SPECIFIED NONINFECTIVE GASTROENTERITIS AND COLITIS: ICD-10-CM

## 2023-11-22 DIAGNOSIS — K21.9 GASTROESOPHAGEAL REFLUX DISEASE WITHOUT ESOPHAGITIS: ICD-10-CM

## 2023-11-22 PROCEDURE — 13000029 HB GI MAJOR COMPLEX CASE EA ADD MINUTE

## 2023-11-22 PROCEDURE — 13000001 HB PHASE II RECOVERY EA 30 MINUTES

## 2023-11-22 PROCEDURE — 13000028 HB GI MAJOR COMPLEX CASE S/U + 1ST 15 MIN

## 2023-11-22 PROCEDURE — 13000008 HB ANESTHESIA MAC OUTSIDE OR

## 2023-11-22 PROCEDURE — 88305 TISSUE EXAM BY PATHOLOGIST: CPT | Performed by: INTERNAL MEDICINE

## 2023-11-22 PROCEDURE — 10002800 HB RX 250 W HCPCS: Performed by: ANESTHESIOLOGY

## 2023-11-22 RX ORDER — PROPOFOL 10 MG/ML
INJECTION, EMULSION INTRAVENOUS PRN
Status: DISCONTINUED | OUTPATIENT
Start: 2023-11-22 | End: 2023-11-22

## 2023-11-22 RX ADMIN — PROPOFOL 100 MCG/KG/MIN: 10 INJECTION, EMULSION INTRAVENOUS at 08:47

## 2023-11-22 RX ADMIN — PROPOFOL 100 MG: 10 INJECTION, EMULSION INTRAVENOUS at 08:47

## 2023-11-22 ASSESSMENT — ENCOUNTER SYMPTOMS
HEADACHES: 1
EXERCISE TOLERANCE: GOOD (>4 METS)

## 2023-11-22 ASSESSMENT — PAIN SCALES - GENERAL
PAINLEVEL_OUTOF10: 0

## 2023-11-24 VITALS
HEIGHT: 70 IN | WEIGHT: 231 LBS | DIASTOLIC BLOOD PRESSURE: 91 MMHG | HEART RATE: 67 BPM | TEMPERATURE: 98.6 F | OXYGEN SATURATION: 96 % | SYSTOLIC BLOOD PRESSURE: 146 MMHG | RESPIRATION RATE: 14 BRPM | BODY MASS INDEX: 33.07 KG/M2

## 2023-11-26 LAB
ASR DISCLAIMER: NORMAL
CASE RPRT: NORMAL
CLINICAL INFO: NORMAL
PATH REPORT.FINAL DX SPEC: NORMAL
PATH REPORT.GROSS SPEC: NORMAL

## 2023-12-11 ENCOUNTER — EXTERNAL RECORD (OUTPATIENT)
Dept: HEALTH INFORMATION MANAGEMENT | Facility: OTHER | Age: 43
End: 2023-12-11

## 2023-12-11 DIAGNOSIS — G43.E09 CHRONIC MIGRAINE WITH AURA: ICD-10-CM

## 2023-12-11 RX ORDER — ATOGEPANT 60 MG/1
1 TABLET ORAL DAILY
Qty: 90 TABLET | Refills: 0 | Status: SHIPPED | OUTPATIENT
Start: 2023-12-11

## 2023-12-11 NOTE — TELEPHONE ENCOUNTER
Requested Prescriptions     Pending Prescriptions Disp Refills    QULIPTA 60 MG Oral Tab [Pharmacy Med Name: Dk Laser 60 MG Oral Tablet (Atogepant)] 30 tablet 1     Sig: Take 1 tablet by mouth daily.        Last OV: 11/7/23  Next OV: None  Last refilled: 10/12/23 #30/1 refill

## 2023-12-12 DIAGNOSIS — G44.009 MIGRAINE-CLUSTER HEADACHE SYNDROME: Primary | ICD-10-CM

## 2023-12-21 ENCOUNTER — HOSPITAL ENCOUNTER (OUTPATIENT)
Dept: CT IMAGING | Age: 43
Discharge: HOME | End: 2023-12-21
Attending: PSYCHIATRY & NEUROLOGY

## 2023-12-21 DIAGNOSIS — G44.009 MIGRAINE-CLUSTER HEADACHE SYNDROME: ICD-10-CM

## 2023-12-21 PROCEDURE — 10002805 HB CONTRAST AGENT: Performed by: PSYCHIATRY & NEUROLOGY

## 2023-12-21 PROCEDURE — 70496 CT ANGIOGRAPHY HEAD: CPT

## 2023-12-21 PROCEDURE — 70450 CT HEAD/BRAIN W/O DYE: CPT

## 2023-12-21 RX ADMIN — IOHEXOL 100 ML: 350 INJECTION, SOLUTION INTRAVENOUS at 11:50

## 2024-01-05 ENCOUNTER — TELEPHONE (OUTPATIENT)
Dept: NEUROLOGY | Facility: CLINIC | Age: 44
End: 2024-01-05

## 2024-01-22 PROBLEM — E53.8 VITAMIN B12 DEFICIENCY: Status: ACTIVE | Noted: 2024-01-11

## 2024-01-22 PROBLEM — K63.5 COLON POLYPS: Status: ACTIVE | Noted: 2024-01-22

## 2024-01-22 PROBLEM — H93.13 TINNITUS, BILATERAL: Status: ACTIVE | Noted: 2024-01-11

## 2024-01-22 PROBLEM — B96.81 HELICOBACTER PYLORI (H. PYLORI) AS THE CAUSE OF DISEASES CLASSIFIED ELSEWHERE: Status: ACTIVE | Noted: 2024-01-22

## 2024-01-22 PROBLEM — L30.9 ECZEMA: Status: ACTIVE | Noted: 2024-01-22

## 2024-01-22 PROBLEM — H93.8X3 EAR PRESSURE, BILATERAL: Status: ACTIVE | Noted: 2024-01-11

## 2024-01-22 PROBLEM — R94.120 ABNORMAL OTOACOUSTIC EMISSIONS TEST: Status: ACTIVE | Noted: 2024-01-11

## 2024-01-22 PROBLEM — F41.9 ANXIETY: Status: ACTIVE | Noted: 2024-01-22

## 2024-01-22 PROBLEM — R26.81 UNSTEADINESS: Status: ACTIVE | Noted: 2024-01-11

## 2024-01-22 PROBLEM — H93.233 HYPERACUSIS, BILATERAL: Status: ACTIVE | Noted: 2024-01-11

## 2024-01-22 PROBLEM — K52.9 CHRONIC DIARRHEA: Status: ACTIVE | Noted: 2024-01-22

## 2024-01-22 PROBLEM — E03.9 HYPOTHYROID: Status: ACTIVE | Noted: 2024-01-22

## 2024-03-08 DIAGNOSIS — G43.E09 CHRONIC MIGRAINE WITH AURA: ICD-10-CM

## 2024-03-08 RX ORDER — ATOGEPANT 60 MG/1
1 TABLET ORAL DAILY
Qty: 90 TABLET | Refills: 0 | Status: SHIPPED | OUTPATIENT
Start: 2024-03-08

## 2024-03-08 NOTE — TELEPHONE ENCOUNTER
LOV 11/07/23  NOV none on file    Refill request for pt Atogepant (QULIPTA) 60 MG Oral Tab, reviewed by RN and routed to provider for review.

## 2024-04-04 ENCOUNTER — HOSPITAL ENCOUNTER (OUTPATIENT)
Dept: ULTRASOUND IMAGING | Facility: HOSPITAL | Age: 44
Discharge: HOME | End: 2024-04-04
Attending: INTERNAL MEDICINE
Payer: COMMERCIAL

## 2024-04-04 DIAGNOSIS — M79.604 RIGHT LEG PAIN: ICD-10-CM

## 2024-04-04 PROCEDURE — 93971 EXTREMITY STUDY: CPT | Performed by: INTERNAL MEDICINE

## 2024-04-08 DIAGNOSIS — I82.411 ACUTE DEEP VEIN THROMBOSIS (DVT) OF FEMORAL VEIN OF RIGHT LOWER EXTREMITY (HCC): ICD-10-CM

## 2024-04-08 DIAGNOSIS — Z51.81 ANTICOAGULATION MANAGEMENT ENCOUNTER: ICD-10-CM

## 2024-04-08 DIAGNOSIS — Z79.01 ANTICOAGULATION MANAGEMENT ENCOUNTER: ICD-10-CM

## 2024-05-03 ENCOUNTER — APPOINTMENT (OUTPATIENT)
Dept: INTERNAL MEDICINE | Age: 44
End: 2024-05-03

## 2024-05-03 VITALS
DIASTOLIC BLOOD PRESSURE: 80 MMHG | OXYGEN SATURATION: 97 % | BODY MASS INDEX: 33.64 KG/M2 | WEIGHT: 235 LBS | HEART RATE: 89 BPM | HEIGHT: 70 IN | TEMPERATURE: 98.1 F | SYSTOLIC BLOOD PRESSURE: 126 MMHG

## 2024-05-03 DIAGNOSIS — M79.661 PAIN OF RIGHT LOWER LEG: ICD-10-CM

## 2024-05-03 DIAGNOSIS — I82.501 CHRONIC DEEP VEIN THROMBOSIS (DVT) OF RIGHT LOWER EXTREMITY, UNSPECIFIED VEIN  (CMD): ICD-10-CM

## 2024-05-03 DIAGNOSIS — G43.519 MIGRAINE AURA, PERSISTENT, INTRACTABLE: ICD-10-CM

## 2024-05-03 DIAGNOSIS — F31.32 BIPOLAR AFFECTIVE DISORDER, CURRENTLY DEPRESSED, MODERATE  (CMD): Primary | ICD-10-CM

## 2024-05-03 DIAGNOSIS — E66.09 CLASS 1 OBESITY DUE TO EXCESS CALORIES WITHOUT SERIOUS COMORBIDITY WITH BODY MASS INDEX (BMI) OF 33.0 TO 33.9 IN ADULT: ICD-10-CM

## 2024-05-03 PROBLEM — E66.811 CLASS 1 OBESITY DUE TO EXCESS CALORIES WITHOUT SERIOUS COMORBIDITY WITH BODY MASS INDEX (BMI) OF 33.0 TO 33.9 IN ADULT: Status: ACTIVE | Noted: 2024-05-03

## 2024-05-03 PROCEDURE — 99204 OFFICE O/P NEW MOD 45 MIN: CPT | Performed by: FAMILY MEDICINE

## 2024-05-03 RX ORDER — IBUPROFEN 800 MG/1
TABLET ORAL
COMMUNITY
End: 2024-05-03 | Stop reason: ALTCHOICE

## 2024-05-03 RX ORDER — HYDROCODONE BITARTRATE AND ACETAMINOPHEN 10; 325 MG/1; MG/1
1 TABLET ORAL EVERY 12 HOURS PRN
Qty: 30 TABLET | Refills: 0 | Status: SHIPPED | OUTPATIENT
Start: 2024-05-03

## 2024-05-03 RX ORDER — GABAPENTIN 100 MG/1
CAPSULE ORAL
COMMUNITY
Start: 2023-12-11

## 2024-05-03 RX ORDER — SEMAGLUTIDE 0.25 MG/.5ML
0.25 INJECTION, SOLUTION SUBCUTANEOUS
Qty: 2 ML | Refills: 2 | Status: SHIPPED | OUTPATIENT
Start: 2024-05-06

## 2024-05-03 RX ORDER — KETOCONAZOLE 20 MG/G
CREAM TOPICAL
COMMUNITY
Start: 2024-03-26

## 2024-05-03 RX ORDER — TOPIRAMATE 25 MG/1
TABLET ORAL
COMMUNITY

## 2024-05-03 ASSESSMENT — ENCOUNTER SYMPTOMS
VOICE CHANGE: 0
BLOOD IN STOOL: 0
SPEECH DIFFICULTY: 0
WHEEZING: 0
FEVER: 0
VOMITING: 0
ABDOMINAL PAIN: 0
BRUISES/BLEEDS EASILY: 0
EYE DISCHARGE: 0
COUGH: 0
CHEST TIGHTNESS: 0
CHILLS: 0
NUMBNESS: 0
NERVOUS/ANXIOUS: 0
SLEEP DISTURBANCE: 0
NAUSEA: 0
HEADACHES: 1
WOUND: 0
DIARRHEA: 0
SHORTNESS OF BREATH: 0
ABDOMINAL DISTENTION: 0
SORE THROAT: 0
FATIGUE: 0
EYE REDNESS: 0
APPETITE CHANGE: 0
DIZZINESS: 0
BACK PAIN: 1
WEAKNESS: 0
CONSTIPATION: 1
SEIZURES: 0
ACTIVITY CHANGE: 0
LIGHT-HEADEDNESS: 0

## 2024-05-03 ASSESSMENT — PATIENT HEALTH QUESTIONNAIRE - PHQ9
SUM OF ALL RESPONSES TO PHQ9 QUESTIONS 1 AND 2: 4
2. FEELING DOWN, DEPRESSED OR HOPELESS: NEARLY EVERY DAY
SUM OF ALL RESPONSES TO PHQ9 QUESTIONS 1 AND 2: 4
CLINICAL INTERPRETATION OF PHQ2 SCORE: FURTHER SCREENING NEEDED
1. LITTLE INTEREST OR PLEASURE IN DOING THINGS: SEVERAL DAYS

## 2024-05-03 ASSESSMENT — PAIN SCALES - GENERAL: PAINLEVEL: 8

## 2024-05-07 ENCOUNTER — APPOINTMENT (OUTPATIENT)
Dept: NEUROLOGY | Age: 44
End: 2024-05-07

## 2024-05-08 ENCOUNTER — LAB SERVICES (OUTPATIENT)
Dept: LAB | Age: 44
End: 2024-05-08

## 2024-05-08 DIAGNOSIS — E66.09 CLASS 1 OBESITY DUE TO EXCESS CALORIES WITHOUT SERIOUS COMORBIDITY WITH BODY MASS INDEX (BMI) OF 33.0 TO 33.9 IN ADULT: ICD-10-CM

## 2024-05-08 DIAGNOSIS — F31.32 BIPOLAR AFFECTIVE DISORDER, CURRENTLY DEPRESSED, MODERATE  (CMD): ICD-10-CM

## 2024-05-08 LAB
ALBUMIN SERPL-MCNC: 3.6 G/DL (ref 3.6–5.1)
ALBUMIN/GLOB SERPL: 1.2 {RATIO} (ref 1–2.4)
ALP SERPL-CCNC: 82 UNITS/L (ref 45–117)
ALT SERPL-CCNC: 51 UNITS/L
ANION GAP SERPL CALC-SCNC: 10 MMOL/L (ref 7–19)
AST SERPL-CCNC: 17 UNITS/L
BASOPHILS # BLD: 0 K/MCL (ref 0–0.3)
BASOPHILS NFR BLD: 0 %
BILIRUB SERPL-MCNC: 0.5 MG/DL (ref 0.2–1)
BUN SERPL-MCNC: 11 MG/DL (ref 6–20)
BUN/CREAT SERPL: 13 (ref 7–25)
CALCIUM SERPL-MCNC: 9.2 MG/DL (ref 8.4–10.2)
CHLORIDE SERPL-SCNC: 103 MMOL/L (ref 97–110)
CHOLEST SERPL-MCNC: 215 MG/DL
CHOLEST/HDLC SERPL: 6.1 {RATIO}
CO2 SERPL-SCNC: 30 MMOL/L (ref 21–32)
CREAT SERPL-MCNC: 0.88 MG/DL (ref 0.67–1.17)
DEPRECATED RDW RBC: 39.9 FL (ref 39–50)
EGFRCR SERPLBLD CKD-EPI 2021: >90 ML/MIN/{1.73_M2}
EOSINOPHIL # BLD: 0.1 K/MCL (ref 0–0.5)
EOSINOPHIL NFR BLD: 1 %
ERYTHROCYTE [DISTWIDTH] IN BLOOD: 11.9 % (ref 11–15)
FASTING DURATION TIME PATIENT: 12 HOURS (ref 0–999)
GLOBULIN SER-MCNC: 3.1 G/DL (ref 2–4)
GLUCOSE SERPL-MCNC: 292 MG/DL (ref 70–99)
HBA1C MFR BLD: 10 % (ref 4.5–5.6)
HCT VFR BLD CALC: 43.3 % (ref 39–51)
HDLC SERPL-MCNC: 35 MG/DL
HGB BLD-MCNC: 15 G/DL (ref 13–17)
IMM GRANULOCYTES # BLD AUTO: 0 K/MCL (ref 0–0.2)
IMM GRANULOCYTES # BLD: 0 %
LDLC SERPL CALC-MCNC: ABNORMAL MG/DL
LDLC SERPL DIRECT ASSAY-MCNC: 125 MG/DL
LYMPHOCYTES # BLD: 1.9 K/MCL (ref 1–4.8)
LYMPHOCYTES NFR BLD: 37 %
MCH RBC QN AUTO: 31.5 PG (ref 26–34)
MCHC RBC AUTO-ENTMCNC: 34.6 G/DL (ref 32–36.5)
MCV RBC AUTO: 91 FL (ref 78–100)
MONOCYTES # BLD: 0.4 K/MCL (ref 0.3–0.9)
MONOCYTES NFR BLD: 8 %
NEUTROPHILS # BLD: 2.7 K/MCL (ref 1.8–7.7)
NEUTROPHILS NFR BLD: 54 %
NONHDLC SERPL-MCNC: 180 MG/DL
NRBC BLD MANUAL-RTO: 0 /100 WBC
PLATELET # BLD AUTO: 207 K/MCL (ref 140–450)
POTASSIUM SERPL-SCNC: 4.4 MMOL/L (ref 3.4–5.1)
PROT SERPL-MCNC: 6.7 G/DL (ref 6.4–8.2)
RBC # BLD: 4.76 MIL/MCL (ref 4.5–5.9)
SODIUM SERPL-SCNC: 139 MMOL/L (ref 135–145)
TRIGL SERPL-MCNC: 474 MG/DL
TSH SERPL-ACNC: 1.8 MCUNITS/ML (ref 0.35–5)
WBC # BLD: 5 K/MCL (ref 4.2–11)

## 2024-05-08 PROCEDURE — 80050 GENERAL HEALTH PANEL: CPT | Performed by: CLINICAL MEDICAL LABORATORY

## 2024-05-08 PROCEDURE — 83721 ASSAY OF BLOOD LIPOPROTEIN: CPT | Performed by: CLINICAL MEDICAL LABORATORY

## 2024-05-08 PROCEDURE — 83036 HEMOGLOBIN GLYCOSYLATED A1C: CPT | Performed by: CLINICAL MEDICAL LABORATORY

## 2024-05-08 PROCEDURE — 36415 COLL VENOUS BLD VENIPUNCTURE: CPT | Performed by: FAMILY MEDICINE

## 2024-05-08 PROCEDURE — 80061 LIPID PANEL: CPT | Performed by: CLINICAL MEDICAL LABORATORY

## 2024-05-09 ENCOUNTER — TELEPHONE (OUTPATIENT)
Dept: INTERNAL MEDICINE | Age: 44
End: 2024-05-09

## 2024-05-09 ENCOUNTER — E-ADVICE (OUTPATIENT)
Dept: INTERNAL MEDICINE | Age: 44
End: 2024-05-09

## 2024-05-09 RX ORDER — ATORVASTATIN CALCIUM 40 MG/1
40 TABLET, FILM COATED ORAL DAILY
Qty: 90 TABLET | Refills: 3 | Status: SHIPPED | OUTPATIENT
Start: 2024-05-09

## 2024-05-10 ENCOUNTER — IMAGING SERVICES (OUTPATIENT)
Dept: GENERAL RADIOLOGY | Age: 44
End: 2024-05-10
Attending: FAMILY MEDICINE

## 2024-05-10 DIAGNOSIS — M79.661 PAIN OF RIGHT LOWER LEG: ICD-10-CM

## 2024-05-10 PROCEDURE — 72100 X-RAY EXAM L-S SPINE 2/3 VWS: CPT | Performed by: RADIOLOGY

## 2024-05-20 ENCOUNTER — TELEPHONE (OUTPATIENT)
Dept: INTERNAL MEDICINE | Age: 44
End: 2024-05-20

## 2024-05-20 ENCOUNTER — E-ADVICE (OUTPATIENT)
Dept: INTERNAL MEDICINE | Age: 44
End: 2024-05-20

## 2024-06-03 DIAGNOSIS — G43.E09 CHRONIC MIGRAINE WITH AURA: ICD-10-CM

## 2024-06-04 ENCOUNTER — EXTERNAL RECORD (OUTPATIENT)
Dept: HEALTH INFORMATION MANAGEMENT | Facility: OTHER | Age: 44
End: 2024-06-04

## 2024-06-04 RX ORDER — ATOGEPANT 60 MG/1
1 TABLET ORAL DAILY
Qty: 90 TABLET | Refills: 0 | Status: SHIPPED | OUTPATIENT
Start: 2024-06-04

## 2024-06-06 ENCOUNTER — TELEPHONE (OUTPATIENT)
Dept: INTERNAL MEDICINE | Age: 44
End: 2024-06-06

## 2024-06-06 NOTE — TELEPHONE ENCOUNTER
Approved    Prior authorization approved  Payer: [facility] Case ID: [id number]  Note from payer: PA Case: [id number], Status: Approved, Coverage Starts on: 6/6/2024 12:00:00 AM, Coverage Ends on: 6/6/2025 12:00:00 AM.  Approval Details    Authorization number: [id number]  Authorized from June 6, 2024 to June 6, 2025  Electronic appeal: Not supported  View History

## 2024-06-06 NOTE — TELEPHONE ENCOUNTER
PA submitted via Epic for Qulipta.  Will await determination.  Reviewed and electronically signed by: RONIT Woody

## 2024-06-07 ENCOUNTER — MED REC SCAN ONLY (OUTPATIENT)
Dept: NEUROLOGY | Facility: CLINIC | Age: 44
End: 2024-06-07

## 2024-06-19 RX ORDER — ATORVASTATIN CALCIUM 40 MG/1
40 TABLET, FILM COATED ORAL DAILY
Qty: 90 TABLET | Refills: 3 | Status: SHIPPED | OUTPATIENT
Start: 2024-06-19

## 2024-06-20 RX ORDER — SEMAGLUTIDE 0.25 MG/.5ML
0.25 INJECTION, SOLUTION SUBCUTANEOUS
Qty: 2 ML | Refills: 2 | Status: SHIPPED | OUTPATIENT
Start: 2024-06-24

## 2024-06-21 ENCOUNTER — E-ADVICE (OUTPATIENT)
Dept: INTERNAL MEDICINE | Age: 44
End: 2024-06-21

## 2024-06-21 RX ORDER — SEMAGLUTIDE 0.25 MG/.5ML
0.25 INJECTION, SOLUTION SUBCUTANEOUS
Qty: 2 ML | Refills: 2 | Status: SHIPPED | OUTPATIENT
Start: 2024-06-24

## 2024-07-10 DIAGNOSIS — Z51.81 ANTICOAGULATION MANAGEMENT ENCOUNTER: ICD-10-CM

## 2024-07-10 DIAGNOSIS — I82.411 ACUTE DEEP VEIN THROMBOSIS (DVT) OF FEMORAL VEIN OF RIGHT LOWER EXTREMITY (HCC): ICD-10-CM

## 2024-07-10 DIAGNOSIS — Z79.01 ANTICOAGULATION MANAGEMENT ENCOUNTER: ICD-10-CM

## 2024-07-10 RX ORDER — APIXABAN 5 MG/1
5 TABLET, FILM COATED ORAL 2 TIMES DAILY
Qty: 180 TABLET | Refills: 3 | OUTPATIENT
Start: 2024-07-10

## 2024-07-10 NOTE — TELEPHONE ENCOUNTER
Patient's wife would like the Eliquis to be delivered to [facility] at [address] [place] [address]. [id number] for 1 month til see Dr. Ortega. Called 7/10/24.

## 2024-07-15 NOTE — TELEPHONE ENCOUNTER
Pt called stated that his Eliquis 5mg was sent to [facility], which he says it was a mistake on their part should be sent to     [id number] - [place], [place] - [id number] [phone], [phone]     Please resend Eliquis 5mg    FYI- pt only has 3 pills left

## 2024-07-16 DIAGNOSIS — I82.411 ACUTE DEEP VEIN THROMBOSIS (DVT) OF FEMORAL VEIN OF RIGHT LOWER EXTREMITY (HCC): ICD-10-CM

## 2024-07-16 DIAGNOSIS — Z79.01 ANTICOAGULATION MANAGEMENT ENCOUNTER: ICD-10-CM

## 2024-07-16 DIAGNOSIS — Z51.81 ANTICOAGULATION MANAGEMENT ENCOUNTER: ICD-10-CM

## 2024-07-16 NOTE — TELEPHONE ENCOUNTER
Patient calling for refill of Elliquis- apixaban 5 MG Oral Tab. Requests 30 day supply instead of 90 day supply. Please send to [facility] in [place]. [facility] [address] [facility]. Called 7/16/24 KM

## 2024-07-18 DIAGNOSIS — Z79.01 ANTICOAGULATION MANAGEMENT ENCOUNTER: ICD-10-CM

## 2024-07-18 DIAGNOSIS — I82.411 ACUTE DEEP VEIN THROMBOSIS (DVT) OF FEMORAL VEIN OF RIGHT LOWER EXTREMITY (HCC): ICD-10-CM

## 2024-07-18 DIAGNOSIS — Z51.81 ANTICOAGULATION MANAGEMENT ENCOUNTER: ICD-10-CM

## 2024-07-18 NOTE — TELEPHONE ENCOUNTER
Pt is calling regarding his apixaban 5mg bid, he states his insurance wouldn't cover the 30day supply at [facility] and he had to pay out of pocket for the medication.    Pt is calling asking for a 90day supply of the apixaban  to be sent to   [id number] - [place], [place] - [id number] [phone], [phone]     Please call pt back if you have any questions, thank you

## 2024-07-19 DIAGNOSIS — Z51.81 ANTICOAGULATION MANAGEMENT ENCOUNTER: ICD-10-CM

## 2024-07-19 DIAGNOSIS — I82.411 ACUTE DEEP VEIN THROMBOSIS (DVT) OF FEMORAL VEIN OF RIGHT LOWER EXTREMITY (HCC): ICD-10-CM

## 2024-07-19 DIAGNOSIS — Z79.01 ANTICOAGULATION MANAGEMENT ENCOUNTER: ICD-10-CM

## 2024-07-19 RX ORDER — SEMAGLUTIDE 0.25 MG/.5ML
0.25 INJECTION, SOLUTION SUBCUTANEOUS
Qty: 2 ML | Refills: 2 | Status: SHIPPED | OUTPATIENT
Start: 2024-07-19

## 2024-07-25 ENCOUNTER — APPOINTMENT (OUTPATIENT)
Dept: INTERNAL MEDICINE | Age: 44
End: 2024-07-25

## 2024-07-25 ENCOUNTER — TELEPHONE (OUTPATIENT)
Dept: INTERNAL MEDICINE | Age: 44
End: 2024-07-25

## 2024-07-25 VITALS
BODY MASS INDEX: 34.36 KG/M2 | TEMPERATURE: 97.1 F | SYSTOLIC BLOOD PRESSURE: 112 MMHG | WEIGHT: 240 LBS | HEIGHT: 70 IN | DIASTOLIC BLOOD PRESSURE: 64 MMHG | HEART RATE: 77 BPM | OXYGEN SATURATION: 99 %

## 2024-07-25 DIAGNOSIS — E66.9 TYPE 2 DIABETES MELLITUS WITH OBESITY  (CMD): Primary | ICD-10-CM

## 2024-07-25 DIAGNOSIS — E11.69 TYPE 2 DIABETES MELLITUS WITH OBESITY  (CMD): Primary | ICD-10-CM

## 2024-07-25 DIAGNOSIS — M79.661 PAIN OF RIGHT LOWER LEG: ICD-10-CM

## 2024-07-25 DIAGNOSIS — E66.09 CLASS 1 OBESITY DUE TO EXCESS CALORIES WITHOUT SERIOUS COMORBIDITY WITH BODY MASS INDEX (BMI) OF 34.0 TO 34.9 IN ADULT: ICD-10-CM

## 2024-07-25 PROCEDURE — 99214 OFFICE O/P EST MOD 30 MIN: CPT | Performed by: FAMILY MEDICINE

## 2024-07-25 PROCEDURE — 3078F DIAST BP <80 MM HG: CPT | Performed by: FAMILY MEDICINE

## 2024-07-25 PROCEDURE — 3074F SYST BP LT 130 MM HG: CPT | Performed by: FAMILY MEDICINE

## 2024-07-25 RX ORDER — SEMAGLUTIDE 0.68 MG/ML
0.25 INJECTION, SOLUTION SUBCUTANEOUS
Qty: 3 ML | Refills: 3 | Status: SHIPPED | OUTPATIENT
Start: 2024-07-25

## 2024-07-25 RX ORDER — TRIAMCINOLONE ACETONIDE 1 MG/G
1 CREAM TOPICAL 2 TIMES DAILY
Qty: 45 G | Refills: 0 | Status: SHIPPED | OUTPATIENT
Start: 2024-07-25

## 2024-07-25 RX ORDER — HYDROCODONE BITARTRATE AND ACETAMINOPHEN 10; 325 MG/1; MG/1
1 TABLET ORAL EVERY 12 HOURS PRN
Qty: 30 TABLET | Refills: 0 | Status: SHIPPED | OUTPATIENT
Start: 2024-07-25

## 2024-07-25 ASSESSMENT — ENCOUNTER SYMPTOMS
BRUISES/BLEEDS EASILY: 0
SPEECH DIFFICULTY: 0
DIARRHEA: 0
VOMITING: 0
SLEEP DISTURBANCE: 0
ABDOMINAL PAIN: 0
SORE THROAT: 0
NUMBNESS: 0
BLOOD IN STOOL: 0
APPETITE CHANGE: 0
CHEST TIGHTNESS: 0
NAUSEA: 0
LIGHT-HEADEDNESS: 0
FEVER: 0
EYE REDNESS: 0
BACK PAIN: 0
VOICE CHANGE: 0
SHORTNESS OF BREATH: 0
FATIGUE: 0
NERVOUS/ANXIOUS: 0
CHILLS: 0
SEIZURES: 0
WOUND: 0
DIZZINESS: 0
COUGH: 0
ACTIVITY CHANGE: 0
EYE DISCHARGE: 0
WEAKNESS: 0
WHEEZING: 0
CONSTIPATION: 0
ABDOMINAL DISTENTION: 0

## 2024-08-22 ENCOUNTER — OFFICE VISIT (OUTPATIENT)
Dept: HEMATOLOGY/ONCOLOGY | Facility: HOSPITAL | Age: 44
End: 2024-08-22
Attending: NURSE PRACTITIONER
Payer: COMMERCIAL

## 2024-08-22 ENCOUNTER — APPOINTMENT (OUTPATIENT)
Dept: HEMATOLOGY/ONCOLOGY | Facility: HOSPITAL | Age: 44
End: 2024-08-22
Attending: STUDENT IN AN ORGANIZED HEALTH CARE EDUCATION/TRAINING PROGRAM
Payer: COMMERCIAL

## 2024-08-22 VITALS
RESPIRATION RATE: 16 BRPM | SYSTOLIC BLOOD PRESSURE: 117 MMHG | WEIGHT: 238.63 LBS | BODY MASS INDEX: 34.16 KG/M2 | HEIGHT: 70 IN | OXYGEN SATURATION: 96 % | TEMPERATURE: 98 F | DIASTOLIC BLOOD PRESSURE: 76 MMHG | HEART RATE: 84 BPM

## 2024-08-22 DIAGNOSIS — I82.411 ACUTE DEEP VEIN THROMBOSIS (DVT) OF FEMORAL VEIN OF RIGHT LOWER EXTREMITY (HCC): ICD-10-CM

## 2024-08-22 DIAGNOSIS — Z79.01 ANTICOAGULATION MANAGEMENT ENCOUNTER: Primary | ICD-10-CM

## 2024-08-22 DIAGNOSIS — Z51.81 ANTICOAGULATION MANAGEMENT ENCOUNTER: Primary | ICD-10-CM

## 2024-08-22 PROCEDURE — 99213 OFFICE O/P EST LOW 20 MIN: CPT | Performed by: NURSE PRACTITIONER

## 2024-08-22 RX ORDER — FREMANEZUMAB-VFRM 225 MG/1.5ML
225 INJECTION SUBCUTANEOUS
COMMUNITY
Start: 2024-01-07

## 2024-08-22 RX ORDER — SEMAGLUTIDE 0.68 MG/ML
INJECTION, SOLUTION SUBCUTANEOUS WEEKLY
COMMUNITY
Start: 2024-07-28

## 2024-08-22 NOTE — PROGRESS NOTES
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    Interval history:  Patient presents today for a 1 year follow up visit.    He states that has not been in good health since his last appointment due to debilitating migraines.  He does see a Neurologist for this but has had medication changes that are not always effective.  Due to his migraines, he is no longer working and no longer driving long distances.      He has been complaint with his anticoagulation.    He often has pain in his right calf but tries to walk most days as he is in the process of losing weight.  His leg pain requires him to see a pain doctor and take Norco.  The pain prompted an ultrasound in April that was negative for thrombus.     Denies headache, weight loss, fever/chills, vision change, sore throat, worsening SOB/YU, chest pain, cough, N/V/D/C, and extremity swelling.        VTE history:  The patient contracted Covid in July 2020 and per reports was very sick for at least a month and continued to have a lingering cough and pulmonary symptoms for a few months.  While he still had some pulmonary symptoms in October 2020, the patient developed sudden onset swelling and pain erythema and warmth to his right lower extremity mainly in his foot and calf.  This went on for about a week and he finally got evaluated and was found to have an acute DVT to his right lower extremity.  He was treated with therapeutic anticoagulation with Xarelto for 6 months and then stop therapy per his PCP.  In December 2021 the patient again developed sudden onset foot calf and now increasing  thigh pain swelling and some erythema and presented to [facility] in mid December.  He was found to have a recurrent DVT but now it progressed up to his proximal femoral vein and per reports he was told it was \"rather large\".  He was admitted for 24 hours and then discharged on therapeutic apixaban.  His swelling has improved in his right lower extremity but he is unable to work due to continued groin pain that he describes as almost like he pulled a groin muscle.  During his initial diagnosis he had some testicular pain and an ultrasound was unremarkable.  He was first seen in our clinic 1/6/2022 and at that time thrombophilia testing for factor V Leiden, prothrombin gene mutation, and antiphospholipid antibodies were negative.  He was continued on Xarelto, and after discussions 8/17/22 decided to continue indefinitely for now.     Review of Systems:  Hematology/Oncology ROS performed and negative except as above in HPI    History/Other:   Current Medications:   AJOVY 225 MG/1.5ML Subcutaneous Solution Auto-injector Inject 225 mg into the skin every 30 (thirty) days.      OZEMPIC, 0.25 OR 0.5 MG/DOSE, 2 MG/3ML Subcutaneous Solution Pen-injector once a week.      LAMOTRIGINE 200 MG Oral Tab Take 1 tablet by mouth daily. 90 tablet 1    pantoprazole 40 MG Oral Tab EC Take 1 tablet by mouth daily (before breakfast). 90 tablet 0    apixaban 5 MG Oral Tab Take 1 tablet (5 mg total) by mouth in the morning and 1 tablet (5 mg total) before bedtime. 180 tablet 3    QULIPTA 60 MG Oral Tab Take 1 tablet by mouth daily. 90 tablet 0    traMADol 50 MG Oral Tab Take 1 tablet (50 mg total) by mouth every 6 (six) hours as needed for Pain. 60 tablet 0    HYDROcodone-acetaminophen  MG Oral Tab Take 1 tablet by mouth every 8 (eight) hours as needed for Pain. 45 tablet 0    risperiDONE 0.5 MG Oral Tab Take 1 tablet (0.5 mg total) by mouth nightly. 90 tablet 1    LORazepam 1 MG Oral Tab Take 1 tablet (1 mg total) by  mouth every 6 (six) hours as needed for Anxiety. 30 tablet 0    zolpidem 10 MG Oral Tab Take 1 tablet (10 mg total) by mouth nightly as needed for Sleep. 30 tablet 0    albuterol 108 (90 Base) MCG/ACT Inhalation Aero Soln Inhale 2 puffs into the lungs every 4 (four) hours as needed for Wheezing. 1 each 2    clotrimazole-betamethasone 1-0.05 % External Cream Apply 1 Application topically 2 (two) times daily as needed. 60 g 1    clobetasol 0.05 % External Cream APPLY TOPICALLY TO BOTH FEET TWICE DAILY     Allergies:   Allergies   Allergen Reactions    Dander     Seasonal ITCHING   Objective:   Blood pressure 117/76, pulse 84, temperature 98 °F (36.7 °C), temperature source Oral, resp. rate 16, height 1.778 m (5' 10\"), weight 108.2 kg (238 lb 9.6 oz), SpO2 96%.  Physical Exam:  General: A&Ox3, NAD  HEENT: PERRL  CV: RRR, S1 and S2 heard  Pulm: normal effort, CTAB  Extremities: no edema  Neurological: grossly intact    Results:   Labs:  Lab Results   Component Value Date    WBC 5.4 08/11/2023    HGB 16.4 08/11/2023    HCT 48.4 08/11/2023     08/11/2023    CREATSERUM 1.07 08/11/2023    BUN 12 08/11/2023     08/11/2023    K 4.3 08/11/2023     08/11/2023    CO2 28 08/11/2023    GLU 97 08/11/2023    CA 9.9 08/11/2023    ALB 4.7 08/11/2023    ALKPHO 79 08/11/2023    BILT 0.6 08/11/2023    TP 7.5 08/11/2023    AST 22 08/11/2023    ALT 33 08/11/2023    PTT 27.7 02/08/2022    T4F 1.3 08/11/2023    TSH 2.21 08/11/2023    PSA 1.2 03/08/2018     Imaging:    RLE ultrasound 4/4/24:  FINDINGS: The femoral and popliteal veins appear normal.  Normal flow was demonstrated with color and pulsed Doppler.  Visualized portions of the great and small saphenous, posterior tibial, and peroneal veins appear normal.       THROMBI: None visible.  COMPRESSIBILITY: Normal.  OTHER: Negative.               Impression   CONCLUSION: Normal examination.       RLE US 2/23/22:  1. There is nonocclusive thrombus noted within 1 of the  paired mid femoral veins as discussed above.  This may be subacute to chronic but correlate clinically.  The remainder of the study is unremarkable for deep venous thrombosis.       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 The patient developed an initial DVT in October 2020 that may have been provoked from an ongoing and severe Covid infection that began in July of the same year. He was treated for 6 months with Xarelto. He developed a recurrent DVT in December 2021 in the right lower extremity that now progressed to the proximal femoral vein and was restarted on apixaban.  His symptoms related to his clot burden have mostly improved although he does have edema after walking for long periods of time or sitting for long periods of time.  He previously was driving long distances on a regular basis.    Given his history of recurrent VTE, with a high risk large proximal femoral DVT in a young male likely in the unprovoked setting, the risks and benefits of prolonged therapeutic anticoagulation was previously discussed and in summer 2022 to determine that we will remain on therapeutic anticoagulation indefinitely.    Thrombophilia testing for factor V Leiden, prothrombin gene mutation, and antiphospholipid antibody syndrome was negative in January 2022.  We could consider D-dimer testing to temporarily discontinue anticoagulation for the purpose of further thrombophilia testing in the future and the patient has previously expressed interest in this.     -continue therapeutic anticoagulation with apixaban 5 mg twice daily, of which he is tolerating well at this time.     --Patient is aware that he will continue this indefinitely for the time being and does not want to stop anticoagulation currently      RTC in 1 year.  Patient aware to call our clinic sooner  with any questions or concerns    SIERRA Ashley  Hematology/Oncology

## 2024-08-23 ENCOUNTER — APPOINTMENT (OUTPATIENT)
Dept: HEMATOLOGY/ONCOLOGY | Facility: HOSPITAL | Age: 44
End: 2024-08-23
Attending: STUDENT IN AN ORGANIZED HEALTH CARE EDUCATION/TRAINING PROGRAM
Payer: COMMERCIAL

## 2024-09-05 DIAGNOSIS — G43.E09 CHRONIC MIGRAINE WITH AURA: ICD-10-CM

## 2024-09-05 RX ORDER — ATOGEPANT 60 MG/1
1 TABLET ORAL DAILY
Qty: 90 TABLET | Refills: 0 | Status: SHIPPED | OUTPATIENT
Start: 2024-09-05

## 2024-09-05 NOTE — TELEPHONE ENCOUNTER
Medication: QULIPTA 60 MG Oral Tab      Date of last refill: 06/04/2024 (#90/0)  [place] 06[place]14/2024     Last office visit: 02/23/2023  Due back to clinic per last office note:  annual  Date next office visit scheduled:    Future Appointments   Date Time Provider Department Center   9/23/2024  3:00 PM Mamadou Edwards MD PM&R ELM [place] [facility]   8/22/2025 12:30 PM Anthony Ortega, DO [facility] HEM ONC [facility]           Last OV note recommendation:    Assessment  1. Migraine with aura and without status migrainosus, not intractable  Patient with history of migraine for few years,   However unclear if everything he describes was a typical classic migraine.  Therefore EEG and MRI also was done and that was not revealing     He will continue with Qulipta and Nurtec as needed use.     At this point possibly developing chronic migraines, and propranolol was not helpful, therefore it will be stopped, continue with topiramate.  At this point we will apply for Botox.     Patient may benefit from a tertiary center evaluation in the future     MRV was not revealing.     In the meantime topiramate will be continued.  Continue to work with vestibular therapist.              Education and counseling provided to patient. Instructed patient to call my office or seek medical attention immediately if symptoms worsen.  Patient verbalized understanding of information given. All questions were answered. All side effects of drugs were discussed.      Return to clinic in: No follow-ups on file.

## 2024-09-09 ENCOUNTER — TELEPHONE (OUTPATIENT)
Dept: INTERNAL MEDICINE | Age: 44
End: 2024-09-09

## 2024-09-09 DIAGNOSIS — E66.9 TYPE 2 DIABETES MELLITUS WITH OBESITY  (CMD): Primary | ICD-10-CM

## 2024-09-09 DIAGNOSIS — E11.69 TYPE 2 DIABETES MELLITUS WITH OBESITY  (CMD): Primary | ICD-10-CM

## 2024-09-10 ENCOUNTER — LAB SERVICES (OUTPATIENT)
Dept: LAB | Age: 44
End: 2024-09-10

## 2024-09-10 DIAGNOSIS — E66.9 TYPE 2 DIABETES MELLITUS WITH OBESITY  (CMD): ICD-10-CM

## 2024-09-10 DIAGNOSIS — E11.69 TYPE 2 DIABETES MELLITUS WITH OBESITY  (CMD): ICD-10-CM

## 2024-09-10 LAB — HBA1C MFR BLD: 6.2 % (ref 4.5–5.6)

## 2024-09-10 PROCEDURE — 36415 COLL VENOUS BLD VENIPUNCTURE: CPT | Performed by: FAMILY MEDICINE

## 2024-09-10 PROCEDURE — 83036 HEMOGLOBIN GLYCOSYLATED A1C: CPT | Performed by: CLINICAL MEDICAL LABORATORY

## 2024-09-11 ENCOUNTER — E-ADVICE (OUTPATIENT)
Dept: INTERNAL MEDICINE | Age: 44
End: 2024-09-11

## 2024-09-11 ENCOUNTER — TELEPHONE (OUTPATIENT)
Dept: INTERNAL MEDICINE | Age: 44
End: 2024-09-11

## 2024-09-23 ENCOUNTER — OFFICE VISIT (OUTPATIENT)
Dept: PHYSICAL MEDICINE AND REHAB | Facility: CLINIC | Age: 44
End: 2024-09-23
Payer: COMMERCIAL

## 2024-09-23 VITALS — HEIGHT: 70 IN | BODY MASS INDEX: 34.07 KG/M2 | WEIGHT: 238 LBS

## 2024-09-23 DIAGNOSIS — I80.9 THROMBOPHLEBITIS: ICD-10-CM

## 2024-09-23 DIAGNOSIS — F41.9 ANXIETY: ICD-10-CM

## 2024-09-23 DIAGNOSIS — M25.561 CHRONIC PAIN OF RIGHT KNEE: Primary | ICD-10-CM

## 2024-09-23 DIAGNOSIS — M79.661 RIGHT CALF PAIN: ICD-10-CM

## 2024-09-23 DIAGNOSIS — G89.29 CHRONIC PAIN OF RIGHT KNEE: Primary | ICD-10-CM

## 2024-09-23 PROBLEM — M79.604 RIGHT LEG PAIN: Status: ACTIVE | Noted: 2024-09-23

## 2024-09-23 PROBLEM — M79.604 RIGHT LEG PAIN: Status: RESOLVED | Noted: 2024-09-23 | Resolved: 2024-09-23

## 2024-09-23 PROCEDURE — 99204 OFFICE O/P NEW MOD 45 MIN: CPT | Performed by: PHYSICAL MEDICINE & REHABILITATION

## 2024-09-23 RX ORDER — TRAMADOL HYDROCHLORIDE 50 MG/1
50 TABLET ORAL EVERY 6 HOURS PRN
Qty: 60 TABLET | Refills: 0 | Status: SHIPPED | OUTPATIENT
Start: 2024-09-23

## 2024-09-23 RX ORDER — HYDROCODONE BITARTRATE AND ACETAMINOPHEN 5; 325 MG/1; MG/1
1 TABLET ORAL EVERY 6 HOURS PRN
Qty: 45 TABLET | Refills: 0 | Status: SHIPPED | OUTPATIENT
Start: 2024-09-23 | End: 2024-10-23

## 2024-09-23 NOTE — PROGRESS NOTES
Low Back Pain H & P    Chief Complaint:    Chief Complaint   Patient presents with    New Patient     New right handed  patient is here with complaints of right knee and bilateral calf pain and was referred by Dr. Hunter. Pain started DEC 2022 for calf. No Current physical therapy.Reports n/t in calf. Takes norco to ease pain. Reports pain to be both throbbing and aching. Pain 8/10 in calf. Knee pain depends on activity ( 4/10) . No imaging has been completed.        HPI:  Lalo Anderson is a 44 year old year old right handed male with a prior history of right leg pain due to right calf pain after having a DVT in 8/2022 from the COVID infection that he had in 7/2022.  He has had right posterior lower leg swelling, cramping, tightness, and tingling in the area of the gastrocnemius muscle.  He denies any right leg weakness and he has no other symptoms in the right leg except for the right knee pain.  Then in 12/2023, he had a blood clot in the groin after coming off of the Elquis in 8/2023.  He had one COVID injection on 12/30/2020 and again on 1/29/2021.    He has has had right medial and anterior knee pain for years.  He did PT for this 10-15 years ago and he was getting right knee steroid injections every 6 months with the last being 12 years ago.  He stopped getting the injections once he stopped playing sports.  He has been taking Vicoprofen for this pain until 2022, when he switched to [place] for the pain.  This pain is increased with running and jogging.  This pain can range form 0-10/10.  This pain started over time with his activities    He has been taking either Norco or Ultram for the pain when he had it.  Every 3-4 months, he will get a refill of the 45 tablets of each one.    The calf pain is the worst.  This is a throbbing and stabbing pain with tingling.  It is worse with sitting and driving and at the end of the day and going from sitting to standing.  This pain rages from 1-8/10.      Past Medical  History   Past Medical History:    Allergic rhinitis    Depression    Esophageal reflux    Hypothyroid       Past Surgical History   Past Surgical History:   Procedure Laterality Date    Other surgical history         Family History   Family History   Problem Relation Age of Onset    Diabetes Maternal Grandfather     Bipolar Disorder Mother     Anxiety Brother        Social History   Social History     Socioeconomic History    Marital status:      Spouse name: Not on file    Number of children: Not on file    Years of education: Not on file    Highest education level: Not on file   Occupational History    Not on file   Tobacco Use    Smoking status: Former     Current packs/day: 0.00     Average packs/day: 0.5 packs/day for 16.0 years (8.0 ttl pk-yrs)     Types: Cigarettes     Start date: 2005     Quit date: 2021     Years since quittin.8    Smokeless tobacco: Never    Tobacco comments:     still thinking about it   Vaping Use    Vaping status: Some Days   Substance and Sexual Activity    Alcohol use: Yes     Comment: once a week    Drug use: No    Sexual activity: Not on file   Other Topics Concern    Caffeine Concern Yes     Comment: energy drink    Exercise Yes    Seat Belt No    Special Diet No    Stress Concern No    Weight Concern No   Social History Narrative    Not on file     Social Determinants of Health     Financial Resource Strain: Low Risk  (2024)    Received from [facility]    Financial Resource Strain     In the past year, have you or any family members you live with been unable to get any of the following when it was really needed? Check all that apply.: None   Food Insecurity: Medium Risk (2024)    Received from [facility]    Food Insecurity     Within the past 12 months, you worried that your food would run out before you got money to buy more.  : Sometimes true     Within the past 12 months, the food you bought just didn't last and you didn't  have money to get more. : Sometimes true   Transportation Needs: Not on file   Physical Activity: High Risk (7/25/2024)    Received from [facility]    Exercise Vital Sign     On average, how many days per week do you engage in moderate to strenuous exercise (like a brisk walk)?: 0 days     On average, how many minutes do you engage in exercise at this level?: 0 min   Stress: High Risk (7/25/2024)    Received from [facility]    Stress     Stress is when someone feels tense, nervous, anxious, or can't sleep at night because their mind is troubled. How stressed are you? : Very much   Social Connections: High Risk (7/25/2024)    Received from [facility]    Social Connections     How often do you see or talk to people that you care about and feel close to? (For example: talking to friends on the phone, visiting friends or family, going to Jainism or club meetings): Less than once a week   Housing Stability: Not on file       Review of Systems  Patient-reported ROS  Constitutional  Sleep Disturbance: admits  Chills: denies  Fever: denies  Weight Gain: denies  Weight Loss: admits   Cardiovascular  Chest Pain: denies  Irregular Heartbeat: denies   Respiratory  Painful Breathing: denies  Wheezing: denies   Gastrointestinal  Bowel Incontinence: denies  Heartburn: denies  Abdominal Pain: denies  Blood in Stool : denies  Rectal Pain: denies   Hematology  Easy Bruising: admits  Easy Bleeding: admits   Genitourinary  Difficulty Urinating: denies  Bladder Incontinence: denies  Pelvic Pain: denies  Painful Urination: denies   Musculoskeletal  Joint Stiffness: denies  Painful Joints: denies  Tailbone Pain: denies  Swollen Joints: denies   Peripheral Vascular  Swelling of Legs/Feet: admits  Cold Extremities: denies   Skin  Open Sores: denies  Nodules or Lumps: denies  Rash: denies   Neurological  Loss of Strength Since last Visit: admits  Tingling/Numbness: admits  Balance: admits    Psychiatric  Anxiety: denies  Depressed Mood: denies        PE:  The patient does appear in his stated age in no distress.  The patient is well groomed.    Psychiatric:  The patient is alert and oriented x 3.  The patient has a normal affect and mood.      Respiratory:  No acute respiratory distress. Patient does not have a cough.    HEENT:  Extraocular muscles are intact. There is no kern icterus. Pupils are equal, round, and reactive to light. No redness or discharge bilaterally.    Skin:  There are no rashes or lesions.    Vitals:  There were no vitals filed for this visit.    Gait:    Gait: Normal gait   Sit to Stand: no difficulty   RIGHT Walking on Toes: no difficulty   LEFT Walking on Toes: no difficulty   RIGHT Walking on Heels: no difficulty   LEFT Walking on Heels: no difficulty     Lumbar Spine:    Scoliosis: No scoliosis present   Lumbar Flexion: 90 degrees Painless   Lumbar Extension: 45 degrees Painless     Lumbar Spine Palpation:    Spinous Processes: Non-tender for all Spinous Processes   Z-joints: Non-tender for all Z-joints   SIJ: Non-tender for bilateral SIJ   Piriformis Muscle: Non-tender bilateral Piriformis muscles   Greater Trochanteric Bursa: Non-tender for bilateral Greater Trochanteric Bursa     Vascular lower extremity:   Dorsalis pedis pulse-RIGHT 2+   Dorsalis pedis pulse-LEFT 2+   Tibialis posterior pulse-RIGHT 2+   Tibialis posterior pulse-LEFT 2+      Neurological Lower Extremity:    Light Touch Sensation: Intact in bilateral Lower Extremities   LE Muscle Strength: All LE strength measurements 5/5   RIGHT plantar reflexes: downward response   LEFT plantar reflexes: downward response   Reflexes: 2+ in bilateral lower extremities     Knee  Inspection: No ecchymosis, no erythema, no swelling   Palpation: Tender at  right medial joint line  right pes anserine bursa   ROM: Flexion full and Extension full   Tests: right Medial laxity: mild  bilateral Lateral laxity: no  bilateral Anterior  drawer sign: Negative  bilateral Posterior drawer sign: Negative     Hip: Hips are stable.    RIGHT hip ROM mildly limited   LEFT hip ROM mildly limited   RIGHT hip flexion Negative pain   LEFT hip flexion Negative pain   RIGHT hip SAIRA test Negative for pain   LEFT hip SAIRA test Negative for pain   RIGHT hip internal rotation Negative for pain   LEFT hip internal rotation Negative for pain   RIGHT hip piriformis stretch test Negative for pain   LEFT hip piriformis stretch test Negative for pain     Neural Tension Tests Lumbar Spine:  Sitting straight leg raise-RIGHT Negative for pain   Sitting straight leg raise-LEFT Negative for pain   Supine straight leg raise-RIGHT Negative for pain   Supine straight leg raise-LEFT Negative for pain     Lymph Nodes:   Inguinal Lymph Nodes Absent     Assessment  1. Chronic pain of right knee    2. Right calf pain    3. Thrombophlebitis    4. Anxiety      Plan  He will get bilateral knee x-rays.    He will get into the PT for the knees.    He will continue with the Ultram for the pain and Norco 5/325 for the severe pain.    He will let me know how he is doing after he has done one month of the PT.    He will follow up in 3-4 months.    The patient understands and agrees with the stated plan.    Mamadou Edwards MD  9/23/2024

## 2024-09-23 NOTE — PATIENT INSTRUCTIONS
Plan  He will get bilateral knee x-rays.    He will get into the PT for the knees.    He will continue with the Ultram for the pain and Norco 5/325 for the severe pain.    He will let me know how he is doing after he has done one month of the PT.    He will follow up in 3-4 months.

## 2024-11-21 ENCOUNTER — TELEPHONE (OUTPATIENT)
Dept: GASTROENTEROLOGY | Age: 44
End: 2024-11-21

## 2024-11-23 SDOH — ECONOMIC STABILITY: HOUSING INSECURITY: DO YOU HAVE PROBLEMS WITH ANY OF THE FOLLOWING?: NONE OF THE ABOVE

## 2024-11-23 SDOH — ECONOMIC STABILITY: HOUSING INSECURITY: WHAT IS YOUR LIVING SITUATION TODAY?: I HAVE A STEADY PLACE TO LIVE

## 2024-11-23 SDOH — ECONOMIC STABILITY: FOOD INSECURITY: WITHIN THE PAST 12 MONTHS, THE FOOD YOU BOUGHT JUST DIDN'T LAST AND YOU DIDN'T HAVE MONEY TO GET MORE.: SOMETIMES TRUE

## 2024-11-23 SDOH — ECONOMIC STABILITY: TRANSPORTATION INSECURITY
IN THE PAST 12 MONTHS, HAS LACK OF RELIABLE TRANSPORTATION KEPT YOU FROM MEDICAL APPOINTMENTS, MEETINGS, WORK OR FROM GETTING THINGS NEEDED FOR DAILY LIVING?: YES;NO

## 2024-11-23 SDOH — ECONOMIC STABILITY: GENERAL: WOULD YOU LIKE HELP WITH ANY OF THE FOLLOWING NEEDS?: FOOD

## 2024-11-23 ASSESSMENT — SOCIAL DETERMINANTS OF HEALTH (SDOH): IN THE PAST 12 MONTHS, HAS THE ELECTRIC, GAS, OIL, OR WATER COMPANY THREATENED TO SHUT OFF SERVICE IN YOUR HOME?: NO

## 2024-11-25 ENCOUNTER — APPOINTMENT (OUTPATIENT)
Dept: INTERNAL MEDICINE | Age: 44
End: 2024-11-25

## 2024-11-25 ENCOUNTER — TELEPHONE (OUTPATIENT)
Dept: GASTROENTEROLOGY | Age: 44
End: 2024-11-25

## 2024-11-25 VITALS
WEIGHT: 229 LBS | BODY MASS INDEX: 32.78 KG/M2 | OXYGEN SATURATION: 99 % | DIASTOLIC BLOOD PRESSURE: 76 MMHG | HEIGHT: 70 IN | SYSTOLIC BLOOD PRESSURE: 98 MMHG | HEART RATE: 87 BPM

## 2024-11-25 DIAGNOSIS — E11.69 TYPE 2 DIABETES MELLITUS WITH OBESITY  (CMD): Primary | ICD-10-CM

## 2024-11-25 DIAGNOSIS — E66.811 CLASS 1 OBESITY DUE TO EXCESS CALORIES WITHOUT SERIOUS COMORBIDITY WITH BODY MASS INDEX (BMI) OF 32.0 TO 32.9 IN ADULT: ICD-10-CM

## 2024-11-25 DIAGNOSIS — E78.2 MIXED HYPERLIPIDEMIA: ICD-10-CM

## 2024-11-25 DIAGNOSIS — E66.09 CLASS 1 OBESITY DUE TO EXCESS CALORIES WITHOUT SERIOUS COMORBIDITY WITH BODY MASS INDEX (BMI) OF 32.0 TO 32.9 IN ADULT: ICD-10-CM

## 2024-11-25 DIAGNOSIS — Z23 NEED FOR INFLUENZA VACCINATION: ICD-10-CM

## 2024-11-25 DIAGNOSIS — E66.9 TYPE 2 DIABETES MELLITUS WITH OBESITY  (CMD): Primary | ICD-10-CM

## 2024-11-25 PROCEDURE — 90471 IMMUNIZATION ADMIN: CPT | Performed by: FAMILY MEDICINE

## 2024-11-25 PROCEDURE — 90656 IIV3 VACC NO PRSV 0.5 ML IM: CPT | Performed by: FAMILY MEDICINE

## 2024-11-25 PROCEDURE — 3078F DIAST BP <80 MM HG: CPT | Performed by: FAMILY MEDICINE

## 2024-11-25 PROCEDURE — 3074F SYST BP LT 130 MM HG: CPT | Performed by: FAMILY MEDICINE

## 2024-11-25 PROCEDURE — 99214 OFFICE O/P EST MOD 30 MIN: CPT | Performed by: FAMILY MEDICINE

## 2024-11-25 RX ORDER — FLUTICASONE PROPIONATE 50 MCG
2 SPRAY, SUSPENSION (ML) NASAL DAILY
Qty: 16 G | Refills: 1 | Status: SHIPPED | OUTPATIENT
Start: 2024-11-25

## 2024-11-25 RX ORDER — SEMAGLUTIDE 1.34 MG/ML
1 INJECTION, SOLUTION SUBCUTANEOUS
Qty: 3 ML | Refills: 3 | Status: SHIPPED | OUTPATIENT
Start: 2024-11-25

## 2024-11-25 RX ORDER — TRAMADOL HYDROCHLORIDE 50 MG/1
50 TABLET ORAL
COMMUNITY
Start: 2024-09-23

## 2024-11-25 ASSESSMENT — ENCOUNTER SYMPTOMS
WEAKNESS: 0
LIGHT-HEADEDNESS: 0
CONSTIPATION: 0
EYE REDNESS: 0
BRUISES/BLEEDS EASILY: 0
DIZZINESS: 0
NAUSEA: 0
EYE DISCHARGE: 0
CHILLS: 0
SORE THROAT: 1
SPEECH DIFFICULTY: 0
VOICE CHANGE: 0
NERVOUS/ANXIOUS: 0
BACK PAIN: 0
VOMITING: 0
ABDOMINAL DISTENTION: 0
BLOOD IN STOOL: 0
WOUND: 0
SHORTNESS OF BREATH: 0
APPETITE CHANGE: 0
ABDOMINAL PAIN: 0
DIARRHEA: 0
WHEEZING: 0
NUMBNESS: 0
COUGH: 0
SLEEP DISTURBANCE: 0
SEIZURES: 0
CHEST TIGHTNESS: 0
ACTIVITY CHANGE: 0
FEVER: 0
FATIGUE: 0

## 2024-11-25 ASSESSMENT — PATIENT HEALTH QUESTIONNAIRE - PHQ9
2. FEELING DOWN, DEPRESSED OR HOPELESS: SEVERAL DAYS
SUM OF ALL RESPONSES TO PHQ9 QUESTIONS 1 AND 2: 2
CLINICAL INTERPRETATION OF PHQ2 SCORE: NO FURTHER SCREENING NEEDED
SUM OF ALL RESPONSES TO PHQ9 QUESTIONS 1 AND 2: 2
1. LITTLE INTEREST OR PLEASURE IN DOING THINGS: SEVERAL DAYS

## 2024-11-25 ASSESSMENT — PAIN SCALES - GENERAL: PAINLEVEL: 7

## 2024-12-01 DIAGNOSIS — G43.E09 CHRONIC MIGRAINE WITH AURA: ICD-10-CM

## 2024-12-02 ENCOUNTER — LAB SERVICES (OUTPATIENT)
Dept: LAB | Age: 44
End: 2024-12-02

## 2024-12-02 DIAGNOSIS — E11.69 TYPE 2 DIABETES MELLITUS WITH OBESITY  (CMD): ICD-10-CM

## 2024-12-02 DIAGNOSIS — E66.9 TYPE 2 DIABETES MELLITUS WITH OBESITY  (CMD): ICD-10-CM

## 2024-12-02 DIAGNOSIS — E78.2 MIXED HYPERLIPIDEMIA: ICD-10-CM

## 2024-12-02 LAB
ALBUMIN SERPL-MCNC: 4 G/DL (ref 3.4–5)
ALBUMIN/GLOB SERPL: 1.3 {RATIO} (ref 1–2.4)
ALP SERPL-CCNC: 94 UNITS/L (ref 45–117)
ALT SERPL-CCNC: 34 UNITS/L
ANION GAP SERPL CALC-SCNC: 9 MMOL/L (ref 7–19)
AST SERPL-CCNC: 18 UNITS/L
BILIRUB SERPL-MCNC: 0.8 MG/DL (ref 0.2–1)
BUN SERPL-MCNC: 8 MG/DL (ref 6–20)
BUN/CREAT SERPL: 8 (ref 7–25)
CALCIUM SERPL-MCNC: 9.7 MG/DL (ref 8.4–10.2)
CHLORIDE SERPL-SCNC: 106 MMOL/L (ref 97–110)
CHOLEST SERPL-MCNC: 125 MG/DL
CHOLEST/HDLC SERPL: 3.3 {RATIO}
CO2 SERPL-SCNC: 29 MMOL/L (ref 21–32)
CREAT SERPL-MCNC: 1.03 MG/DL (ref 0.67–1.17)
EGFRCR SERPLBLD CKD-EPI 2021: >90 ML/MIN/{1.73_M2}
FASTING DURATION TIME PATIENT: 12 HOURS (ref 0–999)
GLOBULIN SER-MCNC: 3 G/DL (ref 2–4)
GLUCOSE SERPL-MCNC: 94 MG/DL (ref 70–99)
HDLC SERPL-MCNC: 38 MG/DL
LDLC SERPL CALC-MCNC: 54 MG/DL
NONHDLC SERPL-MCNC: 87 MG/DL
POTASSIUM SERPL-SCNC: 3.7 MMOL/L (ref 3.4–5.1)
PROT SERPL-MCNC: 7 G/DL (ref 6.4–8.2)
SODIUM SERPL-SCNC: 140 MMOL/L (ref 135–145)
TRIGL SERPL-MCNC: 165 MG/DL

## 2024-12-02 PROCEDURE — 80061 LIPID PANEL: CPT | Performed by: CLINICAL MEDICAL LABORATORY

## 2024-12-02 PROCEDURE — 36415 COLL VENOUS BLD VENIPUNCTURE: CPT | Performed by: FAMILY MEDICINE

## 2024-12-02 PROCEDURE — 80053 COMPREHEN METABOLIC PANEL: CPT | Performed by: CLINICAL MEDICAL LABORATORY

## 2024-12-02 RX ORDER — ATOGEPANT 60 MG/1
1 TABLET ORAL DAILY
Qty: 30 TABLET | Refills: 0 | Status: SHIPPED | OUTPATIENT
Start: 2024-12-02

## 2024-12-02 NOTE — TELEPHONE ENCOUNTER
Requested Prescriptions     Pending Prescriptions Disp Refills    QULIPTA 60 MG Oral Tab [Pharmacy Med Name: Qulipta 60 MG Oral Tablet (Atogepant)] 90 tablet 0     Sig: Take 1 tablet by mouth daily.       Last OV: 11/07/2023 -BOTOX Office visit was 2/23/2023  Next OV:     [place]  Atogepant     Dispensed Written Strength Quantity Refills Days Supply Provider Pharmacy   QULIPTA  60 MG TABS 09/05/2024 09/05/2024  90 tablet  90 Doug Singh MD [facility]   QULIPTA  60 MG TABS 06/14/2024 06/04/2024  90 tablet  90 Doug Singh MD [facility]         Last office visit plan;    Assessment  1. Migraine with aura and without status migrainosus, not intractable  Patient with history of migraine for few years,   However unclear if everything he describes was a typical classic migraine.  Therefore EEG and MRI also was done and that was not revealing     He will continue with Qulipta and Nurtec as needed use.     At this point possibly developing chronic migraines, and propranolol was not helpful, therefore it will be stopped, continue with topiramate.  At this point we will apply for Botox.     Patient may benefit from a tertiary center evaluation in the future     MRV was not revealing.     In the meantime topiramate will be continued.  Continue to work with vestibular therapist.

## 2024-12-03 ENCOUNTER — LAB SERVICES (OUTPATIENT)
Dept: LAB | Age: 44
End: 2024-12-03

## 2024-12-03 DIAGNOSIS — E66.9 TYPE 2 DIABETES MELLITUS WITH OBESITY  (CMD): ICD-10-CM

## 2024-12-03 DIAGNOSIS — E11.69 TYPE 2 DIABETES MELLITUS WITH OBESITY  (CMD): ICD-10-CM

## 2024-12-03 PROCEDURE — 82570 ASSAY OF URINE CREATININE: CPT | Performed by: CLINICAL MEDICAL LABORATORY

## 2024-12-03 PROCEDURE — 82043 UR ALBUMIN QUANTITATIVE: CPT | Performed by: CLINICAL MEDICAL LABORATORY

## 2024-12-04 ENCOUNTER — E-ADVICE (OUTPATIENT)
Dept: INTERNAL MEDICINE | Age: 44
End: 2024-12-04

## 2024-12-04 ENCOUNTER — TELEPHONE (OUTPATIENT)
Dept: INTERNAL MEDICINE | Age: 44
End: 2024-12-04

## 2024-12-04 LAB
CREAT UR-MCNC: 134 MG/DL
MICROALBUMIN UR-MCNC: <0.5 MG/DL
MICROALBUMIN/CREAT UR: NORMAL MG/G{CREAT}

## 2024-12-22 DIAGNOSIS — G43.E09 CHRONIC MIGRAINE WITH AURA: ICD-10-CM

## 2024-12-23 RX ORDER — ATOGEPANT 60 MG/1
1 TABLET ORAL DAILY
Qty: 30 TABLET | Refills: 0 | Status: SHIPPED | OUTPATIENT
Start: 2024-12-23

## 2024-12-23 NOTE — TELEPHONE ENCOUNTER
Requested Prescriptions     Pending Prescriptions Disp Refills    QULIPTA 60 MG Oral Tab [Pharmacy Med Name: Qulipta 60 MG Oral Tablet (Atogepant)] 30 tablet 0     Sig: Take 1 tablet by mouth daily.       Last OV: 11/7/2023-BOTOX   Next OV:       [place]/;  Atogepant     Dispensed Written Strength Quantity Refills Days Supply Provider Pharmacy   QULIPTA  60 MG TABS 12/02/2024 12/02/2024  30 tablet  30 Doug Singh MD [facility]   QULIPTA  60 MG TABS 09/05/2024 09/05/2024  90 tablet  90 Doug Singh MD [facility]

## 2024-12-31 LAB — HM DILATED EYE EXAM: NORMAL

## 2025-01-13 DIAGNOSIS — G43.E09 CHRONIC MIGRAINE WITH AURA: ICD-10-CM

## 2025-01-14 RX ORDER — ATOGEPANT 60 MG/1
1 TABLET ORAL DAILY
Qty: 30 TABLET | Refills: 0 | OUTPATIENT
Start: 2025-01-14

## 2025-01-27 ENCOUNTER — E-ADVICE (OUTPATIENT)
Dept: INTERNAL MEDICINE | Age: 45
End: 2025-01-27

## 2025-01-27 RX ORDER — PANTOPRAZOLE SODIUM 40 MG/1
40 TABLET, DELAYED RELEASE ORAL
Qty: 90 TABLET | Refills: 0 | Status: SHIPPED | OUTPATIENT
Start: 2025-01-27

## 2025-01-28 ENCOUNTER — E-ADVICE (OUTPATIENT)
Dept: INTERNAL MEDICINE | Age: 45
End: 2025-01-28

## 2025-01-28 RX ORDER — LAMOTRIGINE 200 MG/1
200 TABLET ORAL DAILY
Qty: 90 TABLET | Refills: 1 | OUTPATIENT
Start: 2025-01-28

## 2025-02-18 ENCOUNTER — WALK IN (OUTPATIENT)
Dept: URGENT CARE | Age: 45
End: 2025-02-18

## 2025-02-18 VITALS
OXYGEN SATURATION: 100 % | TEMPERATURE: 97.3 F | RESPIRATION RATE: 18 BRPM | SYSTOLIC BLOOD PRESSURE: 114 MMHG | DIASTOLIC BLOOD PRESSURE: 71 MMHG | HEART RATE: 91 BPM

## 2025-02-18 DIAGNOSIS — R05.1 ACUTE COUGH: ICD-10-CM

## 2025-02-18 DIAGNOSIS — J10.1 INFLUENZA A: Primary | ICD-10-CM

## 2025-02-18 DIAGNOSIS — J02.9 SORE THROAT: ICD-10-CM

## 2025-02-18 LAB
FLUAV AG UPPER RESP QL IA.RAPID: POSITIVE
FLUBV AG UPPER RESP QL IA.RAPID: NEGATIVE
INTERNAL PROCEDURAL CONTROLS ACCEPTABLE: YES
S PYO AG THROAT QL IA.RAPID: NEGATIVE
SARS-COV+SARS-COV-2 AG RESP QL IA.RAPID: NOT DETECTED
TEST LOT EXPIRATION DATE: ABNORMAL
TEST LOT EXPIRATION DATE: NORMAL
TEST LOT EXPIRATION DATE: NORMAL
TEST LOT NUMBER: ABNORMAL
TEST LOT NUMBER: NORMAL
TEST LOT NUMBER: NORMAL

## 2025-02-18 RX ORDER — OSELTAMIVIR PHOSPHATE 75 MG/1
75 CAPSULE ORAL 2 TIMES DAILY
Qty: 10 CAPSULE | Refills: 0 | Status: SHIPPED | OUTPATIENT
Start: 2025-02-18 | End: 2025-02-23

## 2025-02-18 RX ORDER — METHYLPREDNISOLONE 4 MG/1
TABLET ORAL
COMMUNITY
Start: 2025-01-21

## 2025-02-18 ASSESSMENT — ENCOUNTER SYMPTOMS
SORE THROAT: 1
HEADACHES: 0
SHORTNESS OF BREATH: 0
FEVER: 0
DIZZINESS: 0
COUGH: 1
DIARRHEA: 0
NAUSEA: 0
VOMITING: 0
ABDOMINAL PAIN: 0
CHILLS: 0

## 2025-02-21 ENCOUNTER — TELEPHONE (OUTPATIENT)
Dept: URGENT CARE | Age: 45
End: 2025-02-21

## 2025-02-26 ENCOUNTER — E-ADVICE (OUTPATIENT)
Dept: INTERNAL MEDICINE | Age: 45
End: 2025-02-26

## 2025-02-26 DIAGNOSIS — E66.9 TYPE 2 DIABETES MELLITUS WITH OBESITY  (CMD): Primary | ICD-10-CM

## 2025-02-26 DIAGNOSIS — E78.2 MIXED HYPERLIPIDEMIA: ICD-10-CM

## 2025-02-26 DIAGNOSIS — E11.69 TYPE 2 DIABETES MELLITUS WITH OBESITY  (CMD): Primary | ICD-10-CM

## 2025-03-03 ENCOUNTER — TELEPHONE (OUTPATIENT)
Dept: INTERNAL MEDICINE | Age: 45
End: 2025-03-03

## 2025-03-07 ENCOUNTER — APPOINTMENT (OUTPATIENT)
Dept: INTERNAL MEDICINE | Age: 45
End: 2025-03-07

## 2025-03-21 ENCOUNTER — LAB SERVICES (OUTPATIENT)
Dept: LAB | Age: 45
End: 2025-03-21

## 2025-03-21 DIAGNOSIS — A69.20 ECM (ERYTHEMA CHRONICUM MIGRANS): Primary | ICD-10-CM

## 2025-03-21 DIAGNOSIS — Z51.81 ENCOUNTER FOR MEDICATION MONITORING: ICD-10-CM

## 2025-03-21 DIAGNOSIS — G43.909 MIGRAINES: ICD-10-CM

## 2025-03-21 LAB — VALPROATE SERPL-MCNC: 47 MCG/ML (ref 50–125)

## 2025-03-21 PROCEDURE — 36415 COLL VENOUS BLD VENIPUNCTURE: CPT | Performed by: INTERNAL MEDICINE

## 2025-03-21 PROCEDURE — 80164 ASSAY DIPROPYLACETIC ACD TOT: CPT | Performed by: CLINICAL MEDICAL LABORATORY

## 2025-03-22 ENCOUNTER — LAB SERVICES (OUTPATIENT)
Dept: LAB | Age: 45
End: 2025-03-22

## 2025-03-22 DIAGNOSIS — G43.909 MIGRAINES: ICD-10-CM

## 2025-03-22 DIAGNOSIS — Z51.81 ENCOUNTER FOR MEDICATION MONITORING: ICD-10-CM

## 2025-03-22 LAB
BASOPHILS # BLD: 0 K/MCL (ref 0–0.3)
BASOPHILS NFR BLD: 0 %
DEPRECATED RDW RBC: 44.6 FL (ref 39–50)
EOSINOPHIL # BLD: 0 K/MCL (ref 0–0.5)
EOSINOPHIL NFR BLD: 0 %
ERYTHROCYTE [DISTWIDTH] IN BLOOD: 12.9 % (ref 11–15)
HCT VFR BLD CALC: 47 % (ref 39–51)
HGB BLD-MCNC: 15.8 G/DL (ref 13–17)
IMM GRANULOCYTES # BLD AUTO: 0 K/MCL (ref 0–0.2)
IMM GRANULOCYTES # BLD: 1 %
LYMPHOCYTES # BLD: 1.7 K/MCL (ref 1–4.8)
LYMPHOCYTES NFR BLD: 19 %
MCH RBC QN AUTO: 31.9 PG (ref 26–34)
MCHC RBC AUTO-ENTMCNC: 33.6 G/DL (ref 32–36.5)
MCV RBC AUTO: 94.8 FL (ref 78–100)
MONOCYTES # BLD: 0.5 K/MCL (ref 0.3–0.9)
MONOCYTES NFR BLD: 5 %
NEUTROPHILS # BLD: 6.5 K/MCL (ref 1.8–7.7)
NEUTROPHILS NFR BLD: 75 %
NRBC BLD MANUAL-RTO: 0 /100 WBC
PLATELET # BLD AUTO: 206 K/MCL (ref 140–450)
RBC # BLD: 4.96 MIL/MCL (ref 4.5–5.9)
WBC # BLD: 8.7 K/MCL (ref 4.2–11)

## 2025-03-22 PROCEDURE — 85025 COMPLETE CBC W/AUTO DIFF WBC: CPT | Performed by: CLINICAL MEDICAL LABORATORY

## 2025-03-22 PROCEDURE — 36415 COLL VENOUS BLD VENIPUNCTURE: CPT | Performed by: INTERNAL MEDICINE

## 2025-03-22 PROCEDURE — 80076 HEPATIC FUNCTION PANEL: CPT | Performed by: CLINICAL MEDICAL LABORATORY

## 2025-03-23 LAB
ALBUMIN SERPL-MCNC: 3.8 G/DL (ref 3.4–5)
ALP SERPL-CCNC: 72 UNITS/L (ref 45–117)
ALT SERPL-CCNC: 23 UNITS/L
AST SERPL-CCNC: 11 UNITS/L
BILIRUB CONJ SERPL-MCNC: 0.1 MG/DL (ref 0–0.2)
BILIRUB SERPL-MCNC: 0.5 MG/DL (ref 0.2–1)
PROT SERPL-MCNC: 7.1 G/DL (ref 6.4–8.2)

## 2025-03-25 ENCOUNTER — LAB SERVICES (OUTPATIENT)
Dept: LAB | Age: 45
End: 2025-03-25

## 2025-03-25 DIAGNOSIS — E66.9 TYPE 2 DIABETES MELLITUS WITH OBESITY  (CMD): ICD-10-CM

## 2025-03-25 DIAGNOSIS — E11.69 TYPE 2 DIABETES MELLITUS WITH OBESITY  (CMD): ICD-10-CM

## 2025-03-25 DIAGNOSIS — E78.2 MIXED HYPERLIPIDEMIA: ICD-10-CM

## 2025-03-25 LAB
ALBUMIN SERPL-MCNC: 3.5 G/DL (ref 3.4–5)
ALBUMIN/GLOB SERPL: 1.1 {RATIO} (ref 1–2.4)
ALP SERPL-CCNC: 66 UNITS/L (ref 45–117)
ALT SERPL-CCNC: 22 UNITS/L
ANION GAP SERPL CALC-SCNC: 10 MMOL/L (ref 7–19)
AST SERPL-CCNC: 8 UNITS/L
BILIRUB SERPL-MCNC: 0.4 MG/DL (ref 0.2–1)
BUN SERPL-MCNC: 17 MG/DL (ref 6–20)
BUN/CREAT SERPL: 17 (ref 7–25)
CALCIUM SERPL-MCNC: 9 MG/DL (ref 8.4–10.2)
CHLORIDE SERPL-SCNC: 103 MMOL/L (ref 97–110)
CHOLEST SERPL-MCNC: 161 MG/DL
CHOLEST/HDLC SERPL: 3 {RATIO}
CO2 SERPL-SCNC: 31 MMOL/L (ref 21–32)
CREAT SERPL-MCNC: 0.98 MG/DL (ref 0.67–1.17)
EGFRCR SERPLBLD CKD-EPI 2021: >90 ML/MIN/{1.73_M2}
FASTING DURATION TIME PATIENT: 12 HOURS (ref 0–999)
GLOBULIN SER-MCNC: 3.1 G/DL (ref 2–4)
GLUCOSE SERPL-MCNC: 138 MG/DL (ref 70–99)
HDLC SERPL-MCNC: 54 MG/DL
LDLC SERPL CALC-MCNC: 75 MG/DL
NONHDLC SERPL-MCNC: 107 MG/DL
POTASSIUM SERPL-SCNC: 3.6 MMOL/L (ref 3.4–5.1)
PROT SERPL-MCNC: 6.6 G/DL (ref 6.4–8.2)
SODIUM SERPL-SCNC: 140 MMOL/L (ref 135–145)
TRIGL SERPL-MCNC: 158 MG/DL

## 2025-03-25 PROCEDURE — 82570 ASSAY OF URINE CREATININE: CPT | Performed by: CLINICAL MEDICAL LABORATORY

## 2025-03-25 PROCEDURE — 80053 COMPREHEN METABOLIC PANEL: CPT | Performed by: CLINICAL MEDICAL LABORATORY

## 2025-03-25 PROCEDURE — 80061 LIPID PANEL: CPT | Performed by: CLINICAL MEDICAL LABORATORY

## 2025-03-25 PROCEDURE — 83036 HEMOGLOBIN GLYCOSYLATED A1C: CPT | Performed by: CLINICAL MEDICAL LABORATORY

## 2025-03-25 PROCEDURE — 85025 COMPLETE CBC W/AUTO DIFF WBC: CPT | Performed by: CLINICAL MEDICAL LABORATORY

## 2025-03-25 PROCEDURE — 36415 COLL VENOUS BLD VENIPUNCTURE: CPT | Performed by: FAMILY MEDICINE

## 2025-03-25 PROCEDURE — 82043 UR ALBUMIN QUANTITATIVE: CPT | Performed by: CLINICAL MEDICAL LABORATORY

## 2025-03-26 LAB
BASOPHILS # BLD: 0 K/MCL (ref 0–0.3)
BASOPHILS NFR BLD: 0 %
CREAT UR-MCNC: 121 MG/DL
DEPRECATED RDW RBC: 44.2 FL (ref 39–50)
EOSINOPHIL # BLD: 0 K/MCL (ref 0–0.5)
EOSINOPHIL NFR BLD: 0 %
ERYTHROCYTE [DISTWIDTH] IN BLOOD: 13.1 % (ref 11–15)
HBA1C MFR BLD: 5.7 % (ref 4.5–5.6)
HCT VFR BLD CALC: 47 % (ref 39–51)
HGB BLD-MCNC: 16.2 G/DL (ref 13–17)
IMM GRANULOCYTES # BLD AUTO: 0 K/MCL (ref 0–0.2)
IMM GRANULOCYTES # BLD: 0 %
LYMPHOCYTES # BLD: 3 K/MCL (ref 1–4.8)
LYMPHOCYTES NFR BLD: 32 %
MCH RBC QN AUTO: 32 PG (ref 26–34)
MCHC RBC AUTO-ENTMCNC: 34.5 G/DL (ref 32–36.5)
MCV RBC AUTO: 92.7 FL (ref 78–100)
MICROALBUMIN UR-MCNC: <0.5 MG/DL
MICROALBUMIN/CREAT UR: NORMAL MG/G{CREAT}
MONOCYTES # BLD: 0.8 K/MCL (ref 0.3–0.9)
MONOCYTES NFR BLD: 8 %
NEUTROPHILS # BLD: 5.5 K/MCL (ref 1.8–7.7)
NEUTROPHILS NFR BLD: 60 %
NRBC BLD MANUAL-RTO: 0 /100 WBC
PLATELET # BLD AUTO: 215 K/MCL (ref 140–450)
RBC # BLD: 5.07 MIL/MCL (ref 4.5–5.9)
WBC # BLD: 9.4 K/MCL (ref 4.2–11)

## 2025-03-28 ENCOUNTER — APPOINTMENT (OUTPATIENT)
Dept: INTERNAL MEDICINE | Age: 45
End: 2025-03-28

## 2025-03-28 VITALS
BODY MASS INDEX: 31.78 KG/M2 | HEIGHT: 70 IN | OXYGEN SATURATION: 97 % | DIASTOLIC BLOOD PRESSURE: 70 MMHG | WEIGHT: 222 LBS | HEART RATE: 87 BPM | TEMPERATURE: 97.1 F | SYSTOLIC BLOOD PRESSURE: 114 MMHG

## 2025-03-28 DIAGNOSIS — E66.811 CLASS 1 OBESITY DUE TO EXCESS CALORIES WITHOUT SERIOUS COMORBIDITY WITH BODY MASS INDEX (BMI) OF 32.0 TO 32.9 IN ADULT: ICD-10-CM

## 2025-03-28 DIAGNOSIS — T14.91XA SUICIDE ATTEMPT  (CMD): ICD-10-CM

## 2025-03-28 DIAGNOSIS — F31.32 BIPOLAR AFFECTIVE DISORDER, CURRENTLY DEPRESSED, MODERATE  (CMD): ICD-10-CM

## 2025-03-28 DIAGNOSIS — M54.16 LUMBAR RADICULOPATHY: ICD-10-CM

## 2025-03-28 DIAGNOSIS — E11.69 TYPE 2 DIABETES MELLITUS WITH OBESITY  (CMD): ICD-10-CM

## 2025-03-28 DIAGNOSIS — E66.9 TYPE 2 DIABETES MELLITUS WITH OBESITY  (CMD): ICD-10-CM

## 2025-03-28 DIAGNOSIS — E78.2 MIXED HYPERLIPIDEMIA: ICD-10-CM

## 2025-03-28 DIAGNOSIS — E66.09 CLASS 1 OBESITY DUE TO EXCESS CALORIES WITHOUT SERIOUS COMORBIDITY WITH BODY MASS INDEX (BMI) OF 32.0 TO 32.9 IN ADULT: ICD-10-CM

## 2025-03-28 DIAGNOSIS — G43.519 MIGRAINE AURA, PERSISTENT, INTRACTABLE: Primary | ICD-10-CM

## 2025-03-28 RX ORDER — TAMSULOSIN HYDROCHLORIDE 0.4 MG/1
0.4 CAPSULE ORAL DAILY
Qty: 30 CAPSULE | Refills: 1 | Status: SHIPPED | OUTPATIENT
Start: 2025-03-28

## 2025-03-28 RX ORDER — LIDOCAINE 50 MG/G
1 PATCH TOPICAL EVERY 24 HOURS
Qty: 30 PATCH | Refills: 0 | Status: SHIPPED | OUTPATIENT
Start: 2025-03-28

## 2025-03-28 RX ORDER — SEMAGLUTIDE 2.68 MG/ML
2 INJECTION, SOLUTION SUBCUTANEOUS
Qty: 3 ML | Refills: 3 | Status: SHIPPED | OUTPATIENT
Start: 2025-03-28

## 2025-03-28 RX ORDER — TAMSULOSIN HYDROCHLORIDE 0.4 MG/1
0.4 CAPSULE ORAL DAILY
Qty: 90 CAPSULE | OUTPATIENT
Start: 2025-03-28

## 2025-03-28 ASSESSMENT — ENCOUNTER SYMPTOMS
BLOOD IN STOOL: 0
EYE REDNESS: 0
COUGH: 0
ABDOMINAL PAIN: 0
VOMITING: 0
DIZZINESS: 0
WEAKNESS: 0
NERVOUS/ANXIOUS: 0
VOICE CHANGE: 0
WHEEZING: 0
ACTIVITY CHANGE: 0
EYE DISCHARGE: 0
NUMBNESS: 1
SPEECH DIFFICULTY: 0
APPETITE CHANGE: 0
SORE THROAT: 0
SEIZURES: 0
CONSTIPATION: 0
LIGHT-HEADEDNESS: 0
FATIGUE: 0
WOUND: 0
FEVER: 0
CHEST TIGHTNESS: 0
DIARRHEA: 0
BRUISES/BLEEDS EASILY: 0
ABDOMINAL DISTENTION: 0
NAUSEA: 0
CHILLS: 0
SHORTNESS OF BREATH: 0
SLEEP DISTURBANCE: 0
BACK PAIN: 0

## 2025-03-28 ASSESSMENT — PATIENT HEALTH QUESTIONNAIRE - PHQ9
SUM OF ALL RESPONSES TO PHQ9 QUESTIONS 1 AND 2: 2
CLINICAL INTERPRETATION OF PHQ2 SCORE: NO FURTHER SCREENING NEEDED
2. FEELING DOWN, DEPRESSED OR HOPELESS: SEVERAL DAYS
SUM OF ALL RESPONSES TO PHQ9 QUESTIONS 1 AND 2: 2
1. LITTLE INTEREST OR PLEASURE IN DOING THINGS: SEVERAL DAYS

## 2025-04-02 ENCOUNTER — TELEPHONE (OUTPATIENT)
Dept: INTERNAL MEDICINE | Age: 45
End: 2025-04-02

## 2025-04-05 ENCOUNTER — HOSPITAL ENCOUNTER (OUTPATIENT)
Age: 45
End: 2025-04-05
Attending: FAMILY MEDICINE

## 2025-04-05 DIAGNOSIS — M54.16 LUMBAR RADICULOPATHY: ICD-10-CM

## 2025-04-05 PROCEDURE — 72148 MRI LUMBAR SPINE W/O DYE: CPT

## 2025-04-07 ENCOUNTER — E-ADVICE (OUTPATIENT)
Dept: INTERNAL MEDICINE | Age: 45
End: 2025-04-07

## 2025-04-07 DIAGNOSIS — M54.16 LUMBAR RADICULOPATHY: Primary | ICD-10-CM

## 2025-04-17 RX ORDER — PANTOPRAZOLE SODIUM 40 MG/1
40 TABLET, DELAYED RELEASE ORAL
Qty: 90 TABLET | Refills: 1 | Status: SHIPPED | OUTPATIENT
Start: 2025-04-17

## 2025-05-13 ENCOUNTER — HOSPITAL ENCOUNTER (OUTPATIENT)
Dept: PAIN MANAGEMENT | Age: 45
Discharge: HOME | End: 2025-05-13
Attending: ANESTHESIOLOGY

## 2025-05-13 VITALS — SYSTOLIC BLOOD PRESSURE: 105 MMHG | HEART RATE: 78 BPM | OXYGEN SATURATION: 98 % | DIASTOLIC BLOOD PRESSURE: 69 MMHG

## 2025-05-13 DIAGNOSIS — M54.16 LUMBAR RADICULITIS: Primary | ICD-10-CM

## 2025-05-13 DIAGNOSIS — M25.511 CHRONIC RIGHT SHOULDER PAIN: ICD-10-CM

## 2025-05-13 DIAGNOSIS — M25.811 IMPINGEMENT OF RIGHT SHOULDER: ICD-10-CM

## 2025-05-13 DIAGNOSIS — G89.29 CHRONIC RIGHT SHOULDER PAIN: ICD-10-CM

## 2025-05-13 DIAGNOSIS — M47.817 LUMBOSACRAL SPONDYLOSIS WITHOUT MYELOPATHY: ICD-10-CM

## 2025-05-13 PROCEDURE — 3078F DIAST BP <80 MM HG: CPT | Performed by: ANESTHESIOLOGY

## 2025-05-13 PROCEDURE — 99214 OFFICE O/P EST MOD 30 MIN: CPT

## 2025-05-13 PROCEDURE — 99204 OFFICE O/P NEW MOD 45 MIN: CPT | Performed by: ANESTHESIOLOGY

## 2025-05-13 PROCEDURE — 3074F SYST BP LT 130 MM HG: CPT | Performed by: ANESTHESIOLOGY

## 2025-05-13 RX ORDER — CELECOXIB 200 MG/1
200 CAPSULE ORAL DAILY
Qty: 30 CAPSULE | Refills: 1 | Status: SHIPPED | OUTPATIENT
Start: 2025-05-13

## 2025-05-13 ASSESSMENT — PAIN SCALES - GENERAL: PAINLEVEL_OUTOF10: 5

## 2025-05-30 ENCOUNTER — APPOINTMENT (OUTPATIENT)
Dept: INTERNAL MEDICINE | Age: 45
End: 2025-05-30

## 2025-06-16 RX ORDER — ATORVASTATIN CALCIUM 40 MG/1
40 TABLET, FILM COATED ORAL DAILY
Qty: 90 TABLET | Refills: 3 | Status: SHIPPED | OUTPATIENT
Start: 2025-06-16

## 2025-06-19 ENCOUNTER — E-ADVICE (OUTPATIENT)
Dept: INTERNAL MEDICINE | Age: 45
End: 2025-06-19

## 2025-06-19 DIAGNOSIS — E11.69 TYPE 2 DIABETES MELLITUS WITH OBESITY  (CMD): Primary | ICD-10-CM

## 2025-06-19 DIAGNOSIS — E66.9 TYPE 2 DIABETES MELLITUS WITH OBESITY  (CMD): Primary | ICD-10-CM

## 2025-06-25 ENCOUNTER — LAB SERVICES (OUTPATIENT)
Dept: LAB | Age: 45
End: 2025-06-25

## 2025-06-25 DIAGNOSIS — E11.69 TYPE 2 DIABETES MELLITUS WITH OBESITY  (CMD): ICD-10-CM

## 2025-06-25 DIAGNOSIS — E66.9 TYPE 2 DIABETES MELLITUS WITH OBESITY  (CMD): ICD-10-CM

## 2025-06-25 LAB
ALBUMIN SERPL-MCNC: 3.6 G/DL (ref 3.4–5)
ALBUMIN/GLOB SERPL: 1.2 {RATIO} (ref 1–2.4)
ALP SERPL-CCNC: 66 UNITS/L (ref 45–117)
ALT SERPL-CCNC: 30 UNITS/L
ANION GAP SERPL CALC-SCNC: 7 MMOL/L (ref 7–19)
AST SERPL-CCNC: 15 UNITS/L
BASOPHILS # BLD: 0 K/MCL (ref 0–0.3)
BASOPHILS NFR BLD: 0 %
BILIRUB SERPL-MCNC: 0.6 MG/DL (ref 0.2–1)
BUN SERPL-MCNC: 12 MG/DL (ref 6–20)
BUN/CREAT SERPL: 12 (ref 7–25)
CALCIUM SERPL-MCNC: 8.9 MG/DL (ref 8.4–10.2)
CHLORIDE SERPL-SCNC: 109 MMOL/L (ref 97–110)
CO2 SERPL-SCNC: 29 MMOL/L (ref 21–32)
CREAT SERPL-MCNC: 0.97 MG/DL (ref 0.67–1.17)
DEPRECATED RDW RBC: 41.9 FL (ref 39–50)
EGFRCR SERPLBLD CKD-EPI 2021: >90 ML/MIN/{1.73_M2}
EOSINOPHIL # BLD: 0.1 K/MCL (ref 0–0.5)
EOSINOPHIL NFR BLD: 2 %
ERYTHROCYTE [DISTWIDTH] IN BLOOD: 11.9 % (ref 11–15)
FASTING DURATION TIME PATIENT: 12 HOURS (ref 0–999)
GLOBULIN SER-MCNC: 2.9 G/DL (ref 2–4)
GLUCOSE SERPL-MCNC: 110 MG/DL (ref 70–99)
HBA1C MFR BLD: 5.7 % (ref 4.5–5.6)
HCT VFR BLD CALC: 46.2 % (ref 39–51)
HGB BLD-MCNC: 15.4 G/DL (ref 13–17)
IMM GRANULOCYTES # BLD AUTO: 0 K/MCL (ref 0–0.2)
IMM GRANULOCYTES # BLD: 0 %
LYMPHOCYTES # BLD: 2 K/MCL (ref 1–4.8)
LYMPHOCYTES NFR BLD: 39 %
MCH RBC QN AUTO: 31.7 PG (ref 26–34)
MCHC RBC AUTO-ENTMCNC: 33.3 G/DL (ref 32–36.5)
MCV RBC AUTO: 95.1 FL (ref 78–100)
MONOCYTES # BLD: 0.5 K/MCL (ref 0.3–0.9)
MONOCYTES NFR BLD: 10 %
NEUTROPHILS # BLD: 2.6 K/MCL (ref 1.8–7.7)
NEUTROPHILS NFR BLD: 49 %
NRBC BLD MANUAL-RTO: 0 /100 WBC
PLATELET # BLD AUTO: 188 K/MCL (ref 140–450)
POTASSIUM SERPL-SCNC: 4.3 MMOL/L (ref 3.4–5.1)
PROT SERPL-MCNC: 6.5 G/DL (ref 6.4–8.2)
RBC # BLD: 4.86 MIL/MCL (ref 4.5–5.9)
SODIUM SERPL-SCNC: 141 MMOL/L (ref 135–145)
WBC # BLD: 5.2 K/MCL (ref 4.2–11)

## 2025-06-25 PROCEDURE — 85025 COMPLETE CBC W/AUTO DIFF WBC: CPT | Performed by: CLINICAL MEDICAL LABORATORY

## 2025-06-25 PROCEDURE — 83036 HEMOGLOBIN GLYCOSYLATED A1C: CPT | Performed by: CLINICAL MEDICAL LABORATORY

## 2025-06-25 PROCEDURE — 80053 COMPREHEN METABOLIC PANEL: CPT | Performed by: CLINICAL MEDICAL LABORATORY

## 2025-06-25 PROCEDURE — 36415 COLL VENOUS BLD VENIPUNCTURE: CPT | Performed by: FAMILY MEDICINE

## 2025-06-27 ENCOUNTER — APPOINTMENT (OUTPATIENT)
Dept: INTERNAL MEDICINE | Age: 45
End: 2025-06-27

## 2025-06-27 VITALS
HEART RATE: 90 BPM | BODY MASS INDEX: 31.5 KG/M2 | OXYGEN SATURATION: 97 % | SYSTOLIC BLOOD PRESSURE: 118 MMHG | WEIGHT: 220 LBS | TEMPERATURE: 97.8 F | HEIGHT: 70 IN | DIASTOLIC BLOOD PRESSURE: 64 MMHG

## 2025-06-27 DIAGNOSIS — E78.2 MIXED HYPERLIPIDEMIA: ICD-10-CM

## 2025-06-27 DIAGNOSIS — E66.09 CLASS 1 OBESITY DUE TO EXCESS CALORIES WITH SERIOUS COMORBIDITY AND BODY MASS INDEX (BMI) OF 31.0 TO 31.9 IN ADULT: ICD-10-CM

## 2025-06-27 DIAGNOSIS — F31.32 BIPOLAR AFFECTIVE DISORDER, CURRENTLY DEPRESSED, MODERATE  (CMD): ICD-10-CM

## 2025-06-27 DIAGNOSIS — E11.69 TYPE 2 DIABETES MELLITUS WITH OBESITY  (CMD): Primary | ICD-10-CM

## 2025-06-27 DIAGNOSIS — E66.811 CLASS 1 OBESITY DUE TO EXCESS CALORIES WITH SERIOUS COMORBIDITY AND BODY MASS INDEX (BMI) OF 31.0 TO 31.9 IN ADULT: ICD-10-CM

## 2025-06-27 DIAGNOSIS — E66.9 TYPE 2 DIABETES MELLITUS WITH OBESITY  (CMD): Primary | ICD-10-CM

## 2025-06-27 DIAGNOSIS — M54.16 LUMBAR RADICULOPATHY: ICD-10-CM

## 2025-06-27 DIAGNOSIS — G43.519 MIGRAINE AURA, PERSISTENT, INTRACTABLE: ICD-10-CM

## 2025-06-27 RX ORDER — SEMAGLUTIDE 2.68 MG/ML
2 INJECTION, SOLUTION SUBCUTANEOUS
Qty: 3 ML | Refills: 3 | Status: SHIPPED | OUTPATIENT
Start: 2025-06-27

## 2025-06-27 SDOH — ECONOMIC STABILITY: FOOD INSECURITY: WITHIN THE PAST 12 MONTHS, THE FOOD YOU BOUGHT JUST DIDN'T LAST AND YOU DIDN'T HAVE MONEY TO GET MORE.: NEVER TRUE

## 2025-06-27 SDOH — ECONOMIC STABILITY: HOUSING INSECURITY: DO YOU HAVE PROBLEMS WITH ANY OF THE FOLLOWING?: NONE OF THE ABOVE

## 2025-06-27 SDOH — ECONOMIC STABILITY: TRANSPORTATION INSECURITY
IN THE PAST 12 MONTHS, HAS LACK OF RELIABLE TRANSPORTATION KEPT YOU FROM MEDICAL APPOINTMENTS, MEETINGS, WORK OR FROM GETTING THINGS NEEDED FOR DAILY LIVING?: NO

## 2025-06-27 SDOH — ECONOMIC STABILITY: HOUSING INSECURITY: WHAT IS YOUR LIVING SITUATION TODAY?: I HAVE A STEADY PLACE TO LIVE

## 2025-06-27 ASSESSMENT — ENCOUNTER SYMPTOMS
FEVER: 0
ABDOMINAL PAIN: 0
WOUND: 0
EYE REDNESS: 0
BRUISES/BLEEDS EASILY: 0
SLEEP DISTURBANCE: 0
NUMBNESS: 0
SORE THROAT: 0
VOICE CHANGE: 0
SEIZURES: 0
WEAKNESS: 0
EYE DISCHARGE: 0
WHEEZING: 0
APPETITE CHANGE: 0
NERVOUS/ANXIOUS: 0
COUGH: 0
LIGHT-HEADEDNESS: 0
ABDOMINAL DISTENTION: 0
NAUSEA: 0
BLOOD IN STOOL: 0
DIARRHEA: 0
BACK PAIN: 0
DIZZINESS: 0
SPEECH DIFFICULTY: 0
CHEST TIGHTNESS: 0
SHORTNESS OF BREATH: 0
CHILLS: 0
VOMITING: 0
FATIGUE: 0
CONSTIPATION: 0
ACTIVITY CHANGE: 0

## 2025-06-27 ASSESSMENT — PATIENT HEALTH QUESTIONNAIRE - PHQ9
CLINICAL INTERPRETATION OF PHQ2 SCORE: NO FURTHER SCREENING NEEDED
1. LITTLE INTEREST OR PLEASURE IN DOING THINGS: NOT AT ALL
2. FEELING DOWN, DEPRESSED OR HOPELESS: NOT AT ALL
SUM OF ALL RESPONSES TO PHQ9 QUESTIONS 1 AND 2: 0
SUM OF ALL RESPONSES TO PHQ9 QUESTIONS 1 AND 2: 0

## 2025-06-27 ASSESSMENT — SOCIAL DETERMINANTS OF HEALTH (SDOH): IN THE PAST 12 MONTHS, HAS THE ELECTRIC, GAS, OIL, OR WATER COMPANY THREATENED TO SHUT OFF SERVICE IN YOUR HOME?: NO

## 2025-07-08 ENCOUNTER — APPOINTMENT (OUTPATIENT)
Dept: PAIN MANAGEMENT | Age: 45
End: 2025-07-08
Attending: ANESTHESIOLOGY

## 2025-07-08 ENCOUNTER — WALK IN (OUTPATIENT)
Dept: URGENT CARE | Age: 45
End: 2025-07-08

## 2025-07-08 VITALS
DIASTOLIC BLOOD PRESSURE: 62 MMHG | OXYGEN SATURATION: 96 % | HEART RATE: 87 BPM | TEMPERATURE: 97.2 F | RESPIRATION RATE: 18 BRPM | SYSTOLIC BLOOD PRESSURE: 97 MMHG

## 2025-07-08 DIAGNOSIS — N39.0 URINARY TRACT INFECTION WITHOUT HEMATURIA, SITE UNSPECIFIED: ICD-10-CM

## 2025-07-08 DIAGNOSIS — E11.69 TYPE 2 DIABETES MELLITUS WITH OBESITY  (CMD): Primary | ICD-10-CM

## 2025-07-08 DIAGNOSIS — E66.9 TYPE 2 DIABETES MELLITUS WITH OBESITY  (CMD): Primary | ICD-10-CM

## 2025-07-08 LAB
APPEARANCE, POC: CLEAR
BILIRUB UR QL STRIP: NEGATIVE
COLOR UR: ABNORMAL
GLUCOSE BLD-MCNC: 138 MG/DL (ref 65–99)
GLUCOSE UR-MCNC: ABNORMAL MG/DL
HGB UR QL STRIP: NEGATIVE
KETONES UR STRIP-MCNC: NEGATIVE MG/DL
LEUKOCYTE ESTERASE UR QL STRIP: NEGATIVE
NITRITE UR QL STRIP: NEGATIVE
PH UR: 5.5 [PH] (ref 5–7)
PROT UR-MCNC: NEGATIVE MG/DL
SP GR UR: <= 1.005 (ref 1–1.03)
TEST LOT EXPIRATION DATE: ABNORMAL
TEST LOT NUMBER: ABNORMAL
UROBILINOGEN UR-MCNC: 0.2 MG/DL (ref 0–1)

## 2025-07-08 PROCEDURE — 87086 URINE CULTURE/COLONY COUNT: CPT | Performed by: CLINICAL MEDICAL LABORATORY

## 2025-07-08 RX ORDER — CLONIDINE HYDROCHLORIDE 0.1 MG/1
TABLET, EXTENDED RELEASE ORAL
COMMUNITY
Start: 2025-07-07

## 2025-07-08 ASSESSMENT — ENCOUNTER SYMPTOMS
CHILLS: 0
FEVER: 0
VOMITING: 0
ABDOMINAL PAIN: 0
NAUSEA: 0
COLOR CHANGE: 0

## 2025-07-09 RX ORDER — CELECOXIB 200 MG/1
200 CAPSULE ORAL DAILY
Qty: 30 CAPSULE | Refills: 1 | Status: SHIPPED | OUTPATIENT
Start: 2025-07-09

## 2025-07-10 ENCOUNTER — E-ADVICE (OUTPATIENT)
Dept: URGENT CARE | Age: 45
End: 2025-07-10

## 2025-07-10 LAB — BACTERIA UR CULT: NO GROWTH

## 2025-08-12 ENCOUNTER — OFFICE VISIT (OUTPATIENT)
Facility: LOCATION | Age: 45
End: 2025-08-12
Attending: STUDENT IN AN ORGANIZED HEALTH CARE EDUCATION/TRAINING PROGRAM

## 2025-08-12 VITALS
OXYGEN SATURATION: 97 % | TEMPERATURE: 97 F | RESPIRATION RATE: 16 BRPM | HEIGHT: 70 IN | DIASTOLIC BLOOD PRESSURE: 70 MMHG | BODY MASS INDEX: 31.92 KG/M2 | HEART RATE: 70 BPM | SYSTOLIC BLOOD PRESSURE: 107 MMHG | WEIGHT: 223 LBS

## 2025-08-12 DIAGNOSIS — Z51.81 ANTICOAGULATION MANAGEMENT ENCOUNTER: ICD-10-CM

## 2025-08-12 DIAGNOSIS — I82.411 ACUTE DEEP VEIN THROMBOSIS (DVT) OF FEMORAL VEIN OF RIGHT LOWER EXTREMITY (HCC): ICD-10-CM

## 2025-08-12 DIAGNOSIS — Z79.01 ANTICOAGULATION MANAGEMENT ENCOUNTER: ICD-10-CM

## 2025-08-17 ENCOUNTER — E-ADVICE (OUTPATIENT)
Dept: INTERNAL MEDICINE | Age: 45
End: 2025-08-17

## 2025-08-26 SDOH — ECONOMIC STABILITY: FOOD INSECURITY: WITHIN THE PAST 12 MONTHS, THE FOOD YOU BOUGHT JUST DIDN'T LAST AND YOU DIDN'T HAVE MONEY TO GET MORE.: SOMETIMES TRUE

## 2025-08-26 SDOH — ECONOMIC STABILITY: HOUSING INSECURITY: WHAT IS YOUR LIVING SITUATION TODAY?: I HAVE A STEADY PLACE TO LIVE

## 2025-08-26 SDOH — ECONOMIC STABILITY: HOUSING INSECURITY: DO YOU HAVE PROBLEMS WITH ANY OF THE FOLLOWING?: NONE OF THE ABOVE

## 2025-08-26 ASSESSMENT — SOCIAL DETERMINANTS OF HEALTH (SDOH): IN THE PAST 12 MONTHS, HAS THE ELECTRIC, GAS, OIL, OR WATER COMPANY THREATENED TO SHUT OFF SERVICE IN YOUR HOME?: NO

## 2025-08-28 ENCOUNTER — APPOINTMENT (OUTPATIENT)
Dept: INTERNAL MEDICINE | Age: 45
End: 2025-08-28

## 2025-08-28 VITALS
OXYGEN SATURATION: 97 % | HEART RATE: 80 BPM | SYSTOLIC BLOOD PRESSURE: 116 MMHG | BODY MASS INDEX: 32.07 KG/M2 | WEIGHT: 224 LBS | DIASTOLIC BLOOD PRESSURE: 72 MMHG | TEMPERATURE: 97.5 F | HEIGHT: 70 IN

## 2025-08-28 DIAGNOSIS — D17.21 LIPOMA OF RIGHT UPPER EXTREMITY: Primary | ICD-10-CM

## 2025-08-28 DIAGNOSIS — E11.69 TYPE 2 DIABETES MELLITUS WITH OBESITY  (CMD): ICD-10-CM

## 2025-08-28 DIAGNOSIS — G43.519 MIGRAINE AURA, PERSISTENT, INTRACTABLE: ICD-10-CM

## 2025-08-28 DIAGNOSIS — E66.9 TYPE 2 DIABETES MELLITUS WITH OBESITY  (CMD): ICD-10-CM

## 2025-08-28 PROBLEM — D17.20 LIPOMA OF UPPER EXTREMITY: Status: ACTIVE | Noted: 2025-08-28

## 2025-08-28 ASSESSMENT — ENCOUNTER SYMPTOMS
SLEEP DISTURBANCE: 0
DIZZINESS: 0
SHORTNESS OF BREATH: 0
CHEST TIGHTNESS: 0
SORE THROAT: 0
FEVER: 0
LIGHT-HEADEDNESS: 0
CHILLS: 0
ABDOMINAL PAIN: 0
COUGH: 0
NUMBNESS: 0
NERVOUS/ANXIOUS: 0
BACK PAIN: 0
WOUND: 0
SPEECH DIFFICULTY: 0
CONSTIPATION: 0
BRUISES/BLEEDS EASILY: 0
ABDOMINAL DISTENTION: 0
APPETITE CHANGE: 0
EYE REDNESS: 0
VOMITING: 0
ACTIVITY CHANGE: 0
EYE DISCHARGE: 0
NAUSEA: 0
DIARRHEA: 0
WHEEZING: 0
HEADACHES: 1
WEAKNESS: 0
SEIZURES: 0
FATIGUE: 0
BLOOD IN STOOL: 0
VOICE CHANGE: 0

## 2025-08-28 ASSESSMENT — PATIENT HEALTH QUESTIONNAIRE - PHQ9
SUM OF ALL RESPONSES TO PHQ9 QUESTIONS 1 AND 2: 2
2. FEELING DOWN, DEPRESSED OR HOPELESS: SEVERAL DAYS
SUM OF ALL RESPONSES TO PHQ9 QUESTIONS 1 AND 2: 2
1. LITTLE INTEREST OR PLEASURE IN DOING THINGS: SEVERAL DAYS

## 2025-08-28 ASSESSMENT — PAIN SCALES - GENERAL: PAINLEVEL_OUTOF10: 0

## 2025-09-02 RX ORDER — CELECOXIB 200 MG/1
200 CAPSULE ORAL DAILY
Qty: 30 CAPSULE | Refills: 1 | Status: SHIPPED | OUTPATIENT
Start: 2025-09-05

## 2025-09-29 ENCOUNTER — APPOINTMENT (OUTPATIENT)
Dept: INTERNAL MEDICINE | Age: 45
End: 2025-09-29

## (undated) NOTE — LETTER
9/23/2024      Mamadou Edwards MD  Physical Medicine and Rehabilitation  [address] [place] [address], [address]  [facility] [address]  Dept: [phone]  Dept Fax: [phone]        RE: Consultation for Lalo Anderson        Dear ESVIN ZHAO MD,    Thank you very much for the opportunity to see your patient.  Attached please find a summary from your patient's recent visit.     I appreciate the chance to take care of your patient with you.  Please feel free to call me with any questions or concerns.    Sincerely,        Mamadou Edwards MD  Electronically Signed on 9/23/2024

## (undated) NOTE — LETTER
22          Lalo Mejia  :  1980      To Whom It May Concern: This patient was seen in our office on 22. Mr. Kayla Kat will need to remain off work until follow-up in 6 weeks. If this office may be of further assistance, please do not hesitate to contact us.       Sincerely,        Yenifer Ramos MD  Neurology

## (undated) NOTE — LETTER
AUTHORIZATION FOR SURGICAL OPERATION OR OTHER PROCEDURE    1. I hereby authorize Dr. Sarah Miles and the [facility] staff assigned to my case to perform the following operation and/or procedure at the [facility]:    _______________________________________________________________________________________________       BOTOX 200 UNITS  _______________________________________________________________________________________________    2. My physician has explained the nature and purpose of the operation or other procedure, possible alternative methods of treatment, the risks involved, and the possibility of complication to me. I acknowledge that no guarantee has been made as to the result that may be obtained. 3.  I recognize that, during the course of this operation, or other procedure, unforseen conditions may necessitate additional or different procedure than those listed above. I, therefore, further authorize and request that the above named physician, his/her physician assistants or designees perform such procedures as are, in his/her professional opinion, necessary and desirable. 4.  Any tissue or organs removed in the operation or other procedure may be disposed of by and at the discretion of the [facility] staff and [facility] AT [facility]. 5.  I understand that in the event of a medical emergency, I will be transported by local paramedics to [facility] or other [facility] emergency department. 6.  I certify that I have read and fully understand the above consent to operation and/or other procedure. 7.  I acknowledge that my physician has explained sedation/analgesia administration to me including the risks and benefits. I consent to the administration of sedation/analgesia as may be necessary or desirable in the judgement of my physician. Witness signature: ___________________________________________________ Date:  ______/______/_____                    Time:  ________ A. M.  P.M. Ruperto Meng  5/29/1980  [id number]       Patient signature:  ___________________________________________________        Statement of Physician  My signature below affirms that prior to the time of the procedure, I have explained to the patient and/or his/her guardian, the risks and benefits involved in the proposed treatment and any reasonable alternative to the proposed treatment. I have also explained the risks and benefits involved in the refusal of the proposed treatment and have answered the patient's questions.                         Date:  ______/______/_______  Provider                      Signature:  __________________________________________________________       Time:  ___________ ANNAMARIE DELONG

## (undated) NOTE — LETTER
Date: 2023      Patient Name: Tai Otoole      : 1980        Thank you for choosing [facility] as your health care provider. Your physician has deemed the following medical service(s) necessary. However, your insurance plan may not pay for all of your health care and costs and may deny payment for this service. The fact that your insurance plan does not pay for an item or service does not mean you should not receive it. The purpose of this form is to help you make an informed decision about whether or not you want to receive this service(s) that may not be paid for by your insurance plan. CPT Code Description     Cost     _________ _Botox_________________________  _$4000________      _________ ______________________________ _____________      _________ ______________________________ _____________      I understand that the above mentioned service(s) or supply may not be covered by my insurance company.  I agree to be financially responsible for the cost of this service or supply in the event of my insurance denies payment as a non-covered benefit.        ______________________________________________________________________  Signature of Patient or Patient's Representative  Relationship  Date    ______________________________________________________________________  Signature of Witness to signing of form   Printed Name

## (undated) NOTE — LETTER
23          Lalo Renee [facility]  :  1980      To Whom It May Concern: This patient was seen in our office on 23 . Work status:  Remain off work for 1 month from today    If this office may be of further assistance, please do not hesitate to contact us.       Sincerely,        Lesvia Reinoso MD

## (undated) NOTE — LETTER
Patient Name: Belen Anne  YOB: 1980          MRN :  [id number]  Date:  1/31/2023  Referring Physician:  No ref. provider found    Michael  Pt has attended 13 visits in Physical Therapy. Reporting period: 10/27/22 to 1/31/2023      Dx: Migraine with aura and without status migrainosus, not intractable (G43.109)  Vertigo (R42)         Insurance (Authorized # of Visits):  21 PPO           Authorizing Physician: Dr. Tobias Huerta MD visit: 11/2/22  Fall Risk: standard         Precautions: n/a         Evaluation Date: 10/27/22  Previous Level of Function: pt was able to driving and working without difficulties    Subjective: Pt reports that he saw the MD and the MD recommended him to apply for disability. Pt reports that the MD changed his meds a little bit. Pt is going to start PT at the new location in mid February. Pt reports that he is still going to vision therapy. Pt reports that they are going to expose him to lights for a prolonged period of time. Pt reports that he is 20% better since beginning PT. Pt reports that he feels like he can do more physical activity, watch the videos longer, less migraines and can be in the car longer. Pt reports that he no longer has neck pain. Pain Headache  3/10; C-spine 0/10; Dizziness: none  Objective:  Physical Exam:  Posture/Observation: poor B rounded shoulders and forward head, slouched sitting                       Cervical spine ROM: [id number]%, Ext 100%, R %, L % , R %, L %         Oculomotor/Vestibular Exam:  Spontaneous Nystagmus:  In light neg In darkness neg  Smooth Pursuit: Negative   Convergence: Positive 6 inches   Head Thrust: Negative  Gaze Evoked Nystagmus: Negative  Head Shaking Nystagmus: Negative         Postural Control:   Romberg: EO 20 sec, EC 3 sec towards the R    Romberg on Foam: EO 10 sec mod sway, EC 6 sec   Tandem Stance: R back EO 10 sec L back EO 10 sec   SLS: R EO 7 sec, L EO 10 sec    Functional Mobility:  Functional Mobility/Gait:slow gait with dec B UE swing    DHI 54/100        Assessment: Pt continues to have symptoms of dizziness and headaches. Pt continues to demo a poor DHI score, dec balance and dizziness. Pt is to be discharged from [facility] and will continue vestibular therapy at another location that accepts his insurance. Goals:   Goals: To be met in 4-6 weeks   1. Pt. to be independent home exercise program, post treatment instructions to allow patients safe return to return to walking in community. -MET  2. Pt. to be able to look under bed without c/o vertigo with 3/3 trials.-not assessed  3. Pt. to improve static/dynamic balance, gait, functional activities with challenges of head turns to allow for community ambulation.-ongoing  4. Pt to report 50% or more dec in symptoms while waking up in the morning.-ongoing  5. Pt to report ability to drive without symptoms. -ongoing    Plan: Discharge from PT at [facility], will resume at another location due to insurance change    Patient was advised of these findings, precautions, and treatment options and has agreed to actively participate in planning and for this course of care. Thank you for your referral. If you have any questions, please contact me at Dept: [phone]. Sincerely,  Electronically signed by therapist Linda Pate PT, DPT, cert MDT    Physician's certification required:  No             21st Century Cures Act Notice to Patient: Medical documents like this are made available to patients in the interest of transparency. However, be advised this is a medical document and it is intended as [id number]-hc-nlek communication between your medical providers. This medical document may contain abbreviations, assessments, medical data, and results or other terms that are unfamiliar. Medical documents are intended to carry relevant information, facts as evident, and the clinical opinion of the practitioner.  As such, this medical document may be written in language that appears blunt or direct. You are encouraged to contact your medical provider and/or [facility] if you have any questions about this medical document.

## (undated) NOTE — LETTER
22          Lalo Wayne Benito  :  1980      To Whom It May Concern: This patient was seen in our office on 22 . Work status:  Remain off work, temporary total disability until re-evaluation in 4 weeks. If this office may be of further assistance, please do not hesitate to contact us.       Sincerely,        Erin Soler MD  Neurology

## (undated) NOTE — LETTER
Date: 2023      Patient Name: Edinson Adler      : 1980        Thank you for choosing  [facility][note 3] as your health care provider. Your physician has deemed the following medical service(s) necessary. However, your insurance plan may not pay for all of your health care and costs and may deny payment for this service. The fact that your insurance plan does not pay for an item or service does not mean you should not receive it. The purpose of this form is to help you make an informed decision about whether or not you want to receive this service(s) that may not be paid for by your insurance plan. CPT Code Description     Cost     _________ BOTOX 200 UNITS    $4000  _________ ______________________________ _____________      _________ ______________________________ _____________      I understand that the above mentioned service(s) or supply may not be covered by my insurance company.  I agree to be financially responsible for the cost of this service or supply in the event of my insurance denies payment as a non-covered benefit.        ______________________________________________________________________  Signature of Patient or Patient's Representative  Relationship  Date    ______________________________________________________________________  Signature of Witness to signing of form   Printed Name

## (undated) NOTE — LETTER
AUTHORIZATION FOR SURGICAL OPERATION OR OTHER PROCEDURE    1. I hereby authorize Dr. Inez Perez and the [facility] staff assigned to my case to perform the following operation and/or procedure at the [facility]:    _Botox_________________________________________________________________________________________      _______________________________________________________________________________________________    2. My physician has explained the nature and purpose of the operation or other procedure, possible alternative methods of treatment, the risks involved, and the possibility of complication to me. I acknowledge that no guarantee has been made as to the result that may be obtained. 3.  I recognize that, during the course of this operation, or other procedure, unforseen conditions may necessitate additional or different procedure than those listed above. I, therefore, further authorize and request that the above named physician, his/her physician assistants or designees perform such procedures as are, in his/her professional opinion, necessary and desirable. 4.  Any tissue or organs removed in the operation or other procedure may be disposed of by and at the discretion of the [facility] staff and [facility] 5.  I understand that in the event of a medical emergency, I will be transported by local paramedics to [facility] or other hospital emergency department. 6.  I certify that I have read and fully understand the above consent to operation and/or other procedure. 7.  I acknowledge that my physician has explained sedation/analgesia administration to me including the risks and benefits. I consent to the administration of sedation/analgesia as may be necessary or desirable in the judgement of my physician. Witness signature: ___________________________________________________ Date:  ______/______/_____                    Time:  ________ A. M.  P.M.        Patient Name: _Miguel [place] Flavin  (please print)       Patient signature:  ___________________________________________________             Relationship to Patient:           []  Parent    Responsible person                          []  Spouse  In case of minor or                    [] Other  _____________   Incompetent name:  __________________________________________________                               (please print)      _____________      Responsible person  In case of minor or  Incompetent signature:  _______________________________________________    Statement of Physician  My signature below affirms that prior to the time of the procedure, I have explained to the patient and/or his/her guardian, the risks and benefits involved in the proposed treatment and any reasonable alternative to the proposed treatment. I have also explained the risks and benefits involved in the refusal of the proposed treatment and have answered the patient's questions.                         Date:  ______/______/_______  Provider                      Signature:  __________________________________________________________       Time:  ___________ A.M    P.M.

## (undated) NOTE — LETTER
Date: 2023      Patient Name: Latrell Pierre      : 1980        Thank you for choosing [facility] as your health care provider. Your physician has deemed the following medical service(s) necessary. However, your insurance plan may not pay for all of your health care and costs and may deny payment for this service. The fact that your insurance plan does not pay for an item or service does not mean you should not receive it. The purpose of this form is to help you make an informed decision about whether or not you want to receive this service(s) that may not be paid for by your insurance plan. CPT Code Description     Cost     _________ BOTOX 200 UNITS    $4000    _________ ______________________________ _____________      I understand that the above mentioned service(s) or supply may not be covered by my insurance company.  I agree to be financially responsible for the cost of this service or supply in the event of my insurance denies payment as a non-covered benefit.        ______________________________________________________________________  Signature of Patient or Patient's Representative  Relationship  Date    ______________________________________________________________________  Signature of Witness to signing of form   Printed Name

## (undated) NOTE — LETTER
AUTHORIZATION FOR SURGICAL OPERATION OR OTHER PROCEDURE    1. I hereby authorize Dr. Jeanette Yeung and the [facility] staff assigned to my case to perform the following operation and/or procedure at the [facility]:    _______________________________________________________________________________________________       BOTOX 200 UNITS  _______________________________________________________________________________________________    2. My physician has explained the nature and purpose of the operation or other procedure, possible alternative methods of treatment, the risks involved, and the possibility of complication to me. I acknowledge that no guarantee has been made as to the result that may be obtained. 3.  I recognize that, during the course of this operation, or other procedure, unforseen conditions may necessitate additional or different procedure than those listed above. I, therefore, further authorize and request that the above named physician, his/her physician assistants or designees perform such procedures as are, in his/her professional opinion, necessary and desirable. 4.  Any tissue or organs removed in the operation or other procedure may be disposed of by and at the discretion of the [facility] staff and [facility]. 5.  I understand that in the event of a medical emergency, I will be transported by local paramedics to [facility] or other hospitals emergency department. 6.  I certify that I have read and fully understand the above consent to operation and/or other procedure. 7.  I acknowledge that my physician has explained sedation/analgesia administration to me including the risks and benefits. I consent to the administration of sedation/analgesia as may be necessary or desirable in the judgement of my physician. Witness signature: ___________________________________________________ Date:  ______/______/_____                    Time:  ________ A. M.  P.M. Jarett Reed  5/29/1980  [id number]       Patient signature:  ___________________________________________________      Statement of Physician  My signature below affirms that prior to the time of the procedure, I have explained to the patient and/or his/her guardian, the risks and benefits involved in the proposed treatment and any reasonable alternative to the proposed treatment. I have also explained the risks and benefits involved in the refusal of the proposed treatment and have answered the patient's questions.                         Date:  ______/______/_______  Provider                      Signature:  __________________________________________________________       Time:  ___________ ANNAMARIE DELONG

## (undated) NOTE — LETTER
10/05/22          Laol Gunna Sam  :  1980      To Whom It May Concern: This patient was seen in our office on 10/05/22. Work status:  Remain off work, temporary total disability for one month. If this office may be of further assistance, please do not hesitate to contact us.       Sincerely,    Aaron Gallegos MD  Neurology